# Patient Record
Sex: FEMALE | Race: WHITE | NOT HISPANIC OR LATINO | Employment: FULL TIME | URBAN - METROPOLITAN AREA
[De-identification: names, ages, dates, MRNs, and addresses within clinical notes are randomized per-mention and may not be internally consistent; named-entity substitution may affect disease eponyms.]

---

## 2023-03-13 LAB
ALANINE AMINOTRANSFERASE (SGPT) (U/L) IN SER/PLAS: 173 U/L (ref 7–45)
ALBUMIN (G/DL) IN SER/PLAS: 3.9 G/DL (ref 3.4–5)
ALKALINE PHOSPHATASE (U/L) IN SER/PLAS: 498 U/L (ref 33–110)
ANION GAP IN SER/PLAS: 12 MMOL/L (ref 10–20)
ASPARTATE AMINOTRANSFERASE (SGOT) (U/L) IN SER/PLAS: 133 U/L (ref 9–39)
BASOPHILS (10*3/UL) IN BLOOD BY AUTOMATED COUNT: 0.03 X10E9/L (ref 0–0.1)
BASOPHILS/100 LEUKOCYTES IN BLOOD BY AUTOMATED COUNT: 0.7 % (ref 0–2)
BILIRUBIN TOTAL (MG/DL) IN SER/PLAS: 1.1 MG/DL (ref 0–1.2)
CALCIUM (MG/DL) IN SER/PLAS: 9.6 MG/DL (ref 8.6–10.3)
CARBON DIOXIDE, TOTAL (MMOL/L) IN SER/PLAS: 26 MMOL/L (ref 21–32)
CHLORIDE (MMOL/L) IN SER/PLAS: 104 MMOL/L (ref 98–107)
CREATININE (MG/DL) IN SER/PLAS: 0.72 MG/DL (ref 0.5–1.05)
EOSINOPHILS (10*3/UL) IN BLOOD BY AUTOMATED COUNT: 0.23 X10E9/L (ref 0–0.7)
EOSINOPHILS/100 LEUKOCYTES IN BLOOD BY AUTOMATED COUNT: 5.1 % (ref 0–6)
ERYTHROCYTE DISTRIBUTION WIDTH (RATIO) BY AUTOMATED COUNT: 12.3 % (ref 11.5–14.5)
ERYTHROCYTE MEAN CORPUSCULAR HEMOGLOBIN CONCENTRATION (G/DL) BY AUTOMATED: 32.9 G/DL (ref 32–36)
ERYTHROCYTE MEAN CORPUSCULAR VOLUME (FL) BY AUTOMATED COUNT: 101 FL (ref 80–100)
ERYTHROCYTES (10*6/UL) IN BLOOD BY AUTOMATED COUNT: 4.17 X10E12/L (ref 4–5.2)
GFR FEMALE: >90 ML/MIN/1.73M2
GLUCOSE (MG/DL) IN SER/PLAS: 90 MG/DL (ref 74–99)
HEMATOCRIT (%) IN BLOOD BY AUTOMATED COUNT: 42.3 % (ref 36–46)
HEMOGLOBIN (G/DL) IN BLOOD: 13.9 G/DL (ref 12–16)
IMMATURE GRANULOCYTES/100 LEUKOCYTES IN BLOOD BY AUTOMATED COUNT: 0.4 % (ref 0–0.9)
LEUKOCYTES (10*3/UL) IN BLOOD BY AUTOMATED COUNT: 4.5 X10E9/L (ref 4.4–11.3)
LYMPHOCYTES (10*3/UL) IN BLOOD BY AUTOMATED COUNT: 1.45 X10E9/L (ref 1.2–4.8)
LYMPHOCYTES/100 LEUKOCYTES IN BLOOD BY AUTOMATED COUNT: 32.2 % (ref 13–44)
MONOCYTES (10*3/UL) IN BLOOD BY AUTOMATED COUNT: 0.56 X10E9/L (ref 0.1–1)
MONOCYTES/100 LEUKOCYTES IN BLOOD BY AUTOMATED COUNT: 12.4 % (ref 2–10)
NEUTROPHILS (10*3/UL) IN BLOOD BY AUTOMATED COUNT: 2.22 X10E9/L (ref 1.2–7.7)
NEUTROPHILS/100 LEUKOCYTES IN BLOOD BY AUTOMATED COUNT: 49.2 % (ref 40–80)
PLATELETS (10*3/UL) IN BLOOD AUTOMATED COUNT: 147 X10E9/L (ref 150–450)
POTASSIUM (MMOL/L) IN SER/PLAS: 4.1 MMOL/L (ref 3.5–5.3)
PROTEIN TOTAL: 7.6 G/DL (ref 6.4–8.2)
SODIUM (MMOL/L) IN SER/PLAS: 138 MMOL/L (ref 136–145)
UREA NITROGEN (MG/DL) IN SER/PLAS: 15 MG/DL (ref 6–23)

## 2023-03-14 NOTE — RESULT ENCOUNTER NOTE
Please advise patient her Alk phos, ALT, and AST are all significantly elevated. However, they are improved since her last labs in 5/2022. She should follow-up with GI, Dr. Jb Villalpando. Please assist her to schedule that appointment.

## 2023-03-15 PROBLEM — M62.838 MUSCLE SPASM: Status: ACTIVE | Noted: 2023-03-15

## 2023-03-15 PROBLEM — G89.29 CHRONIC RIGHT FLANK PAIN: Status: ACTIVE | Noted: 2023-03-15

## 2023-03-15 PROBLEM — K59.09 CHRONIC CONSTIPATION: Status: ACTIVE | Noted: 2023-03-15

## 2023-03-15 PROBLEM — E50.9 VITAMIN A DEFICIENCY: Status: ACTIVE | Noted: 2023-03-15

## 2023-03-15 PROBLEM — G47.26 SHIFT WORK SLEEP DISORDER: Status: ACTIVE | Noted: 2023-03-15

## 2023-03-15 PROBLEM — M54.42 CHRONIC LOW BACK PAIN WITH BILATERAL SCIATICA: Status: ACTIVE | Noted: 2023-03-15

## 2023-03-15 PROBLEM — R00.2 HEART PALPITATIONS: Status: ACTIVE | Noted: 2023-03-15

## 2023-03-15 PROBLEM — R74.8 ELEVATED ALKALINE PHOSPHATASE LEVEL: Status: ACTIVE | Noted: 2023-03-15

## 2023-03-15 PROBLEM — R74.8 ABNORMAL LIVER ENZYMES: Status: ACTIVE | Noted: 2023-03-15

## 2023-03-15 PROBLEM — R23.3 PETECHIAL RASH: Status: ACTIVE | Noted: 2023-03-15

## 2023-03-15 PROBLEM — N80.9 ENDOMETRIOSIS: Status: ACTIVE | Noted: 2023-03-15

## 2023-03-15 PROBLEM — U07.1 COVID-19: Status: ACTIVE | Noted: 2023-03-15

## 2023-03-15 PROBLEM — F43.21 GRIEVING: Status: ACTIVE | Noted: 2023-03-15

## 2023-03-15 PROBLEM — R10.9 CHRONIC RIGHT FLANK PAIN: Status: ACTIVE | Noted: 2023-03-15

## 2023-03-15 PROBLEM — R53.81 MALAISE AND FATIGUE: Status: ACTIVE | Noted: 2023-03-15

## 2023-03-15 PROBLEM — L65.9 HAIR LOSS: Status: ACTIVE | Noted: 2023-03-15

## 2023-03-15 PROBLEM — K74.3 PRIMARY BILIARY CHOLANGITIS (MULTI): Status: ACTIVE | Noted: 2023-03-15

## 2023-03-15 PROBLEM — F41.9 ANXIETY: Status: ACTIVE | Noted: 2023-03-15

## 2023-03-15 PROBLEM — E55.9 VITAMIN D DEFICIENCY: Status: ACTIVE | Noted: 2023-03-15

## 2023-03-15 PROBLEM — G89.29 CHRONIC LOW BACK PAIN WITH BILATERAL SCIATICA: Status: ACTIVE | Noted: 2023-03-15

## 2023-03-15 PROBLEM — R11.0 DAILY NAUSEA: Status: ACTIVE | Noted: 2023-03-15

## 2023-03-15 PROBLEM — M81.0 OSTEOPOROSIS: Status: ACTIVE | Noted: 2023-03-15

## 2023-03-15 PROBLEM — E53.8 VITAMIN B 12 DEFICIENCY: Status: ACTIVE | Noted: 2023-03-15

## 2023-03-15 PROBLEM — R76.8 POSITIVE ANA (ANTINUCLEAR ANTIBODY): Status: ACTIVE | Noted: 2023-03-15

## 2023-03-15 PROBLEM — R51.9 HEADACHE: Status: ACTIVE | Noted: 2023-03-15

## 2023-03-15 PROBLEM — M54.41 CHRONIC LOW BACK PAIN WITH BILATERAL SCIATICA: Status: ACTIVE | Noted: 2023-03-15

## 2023-03-15 PROBLEM — U07.1 COVID-19 VIRUS INFECTION: Status: ACTIVE | Noted: 2023-03-15

## 2023-03-15 PROBLEM — B02.9 HERPES ZOSTER: Status: ACTIVE | Noted: 2023-03-15

## 2023-03-15 PROBLEM — B02.29 POST HERPETIC NEURALGIA: Status: ACTIVE | Noted: 2023-03-15

## 2023-03-15 PROBLEM — Z86.19 HISTORY OF SHINGLES: Status: ACTIVE | Noted: 2023-03-15

## 2023-03-15 PROBLEM — B99.9 RECURRENT INFECTIONS: Status: ACTIVE | Noted: 2023-03-15

## 2023-03-15 PROBLEM — U09.9 POST COVID-19 CONDITION, UNSPECIFIED: Status: ACTIVE | Noted: 2023-03-15

## 2023-03-15 PROBLEM — R53.83 MALAISE AND FATIGUE: Status: ACTIVE | Noted: 2023-03-15

## 2023-03-15 RX ORDER — ALBUTEROL SULFATE 0.83 MG/ML
2.5 SOLUTION RESPIRATORY (INHALATION)
COMMUNITY
Start: 2021-11-24 | End: 2023-07-03 | Stop reason: ALTCHOICE

## 2023-03-15 RX ORDER — CALCIUM CARBONATE 300MG(750)
TABLET,CHEWABLE ORAL
COMMUNITY

## 2023-03-15 RX ORDER — BACLOFEN 10 MG/1
1 TABLET ORAL 3 TIMES DAILY PRN
COMMUNITY
Start: 2021-09-07 | End: 2023-10-25 | Stop reason: ALTCHOICE

## 2023-03-15 RX ORDER — OLANZAPINE 10 MG/1
1 TABLET ORAL NIGHTLY
COMMUNITY

## 2023-03-15 RX ORDER — GABAPENTIN 400 MG/1
1 CAPSULE ORAL 3 TIMES DAILY
COMMUNITY
Start: 2020-12-07

## 2023-03-15 RX ORDER — CETIRIZINE HYDROCHLORIDE 10 MG/1
1 TABLET ORAL DAILY
COMMUNITY

## 2023-03-15 RX ORDER — KETOROLAC TROMETHAMINE 10 MG/1
10 TABLET, FILM COATED ORAL DAILY PRN
COMMUNITY
Start: 2021-09-07

## 2023-03-15 RX ORDER — OMEPRAZOLE 40 MG/1
1 CAPSULE, DELAYED RELEASE ORAL DAILY
COMMUNITY
Start: 2019-08-14

## 2023-03-16 ENCOUNTER — OFFICE VISIT (OUTPATIENT)
Dept: PRIMARY CARE | Facility: CLINIC | Age: 48
End: 2023-03-16
Payer: COMMERCIAL

## 2023-03-16 VITALS
WEIGHT: 150 LBS | DIASTOLIC BLOOD PRESSURE: 61 MMHG | BODY MASS INDEX: 23.49 KG/M2 | OXYGEN SATURATION: 96 % | TEMPERATURE: 98.8 F | RESPIRATION RATE: 16 BRPM | SYSTOLIC BLOOD PRESSURE: 95 MMHG | HEART RATE: 87 BPM

## 2023-03-16 DIAGNOSIS — R53.83 MALAISE AND FATIGUE: Primary | ICD-10-CM

## 2023-03-16 DIAGNOSIS — R53.81 MALAISE AND FATIGUE: Primary | ICD-10-CM

## 2023-03-16 PROCEDURE — 99214 OFFICE O/P EST MOD 30 MIN: CPT | Performed by: FAMILY MEDICINE

## 2023-03-16 NOTE — PROGRESS NOTES
"Subjective   Patient ID: Olya Watters is a 47 y.o. female who presents for Fatigue (Had covid in feb but within the last week is extremely exhausted. ), B12 Injection (Stopped over 1 yr ago ), and Insomnia (Trouble falling asleep. So tired but cannot go to sleep. Trouble getting out of bed in the morning. ).    HPI   Patient comes in today for evaluation of malaise and fatigue.  She states she is tired all the time.  She just feels like she is \"going to collapse\".  Its been going on for at least a week and a half.  She describes herself normally as a go-getter.  She states she did have COVID in early February 2023 but she recovered pretty much back to normal.  She is at a loss to explain what is going on now.  She  denies any new stressors in her life.  Review of Systems   All other systems reviewed and are negative.      Objective   BP 95/61   Pulse 87   Temp 37.1 °C (98.8 °F)   Resp 16   Wt 68 kg (150 lb)   LMP  (LMP Unknown)   SpO2 96%   BMI 23.49 kg/m²     Physical Exam  Constitutional:       Appearance: She is well-developed.   HENT:      Head: Normocephalic and atraumatic.      Right Ear: Tympanic membrane, ear canal and external ear normal.      Left Ear: Tympanic membrane, ear canal and external ear normal.      Nose: Nose normal.      Mouth/Throat:      Mouth: Mucous membranes are moist.      Pharynx: Oropharynx is clear.   Eyes:      Extraocular Movements: Extraocular movements intact.      Conjunctiva/sclera: Conjunctivae normal.      Pupils: Pupils are equal, round, and reactive to light.   Cardiovascular:      Rate and Rhythm: Normal rate and regular rhythm.      Heart sounds: Normal heart sounds. No murmur heard.  Pulmonary:      Effort: Pulmonary effort is normal.      Breath sounds: Normal breath sounds. No wheezing.   Abdominal:      General: Abdomen is flat. Bowel sounds are normal.      Palpations: Abdomen is soft.   Musculoskeletal:         General: Normal range of motion.   Skin:     " General: Skin is warm and dry.   Neurological:      General: No focal deficit present.      Mental Status: She is alert and oriented to person, place, and time. Mental status is at baseline.   Psychiatric:         Mood and Affect: Mood normal.         Behavior: Behavior normal.         Thought Content: Thought content normal.         Judgment: Judgment normal.         Assessment/Plan   Problem List Items Addressed This Visit       Malaise and fatigue - Primary    Relevant Orders    Lipid Panel (Completed)    Vitamin B12 (Completed)    TSH with reflex to Free T4 if abnormal (Completed)    Hemoglobin A1C (Completed)    Vitamin D, Total (Completed)   Will contact patient with lab results when available.  Medication list reconciled.  Thank you for visiting today!      Professional services: Some of this note was completed using Dragon voice technology and sometimes the software misinterprets words. This may include unintended errors with respect to translation of words, typographical errors or grammar errors which may not have been identified prior to finalization of the chart note. Please take this into account when reading the note. Thank you.

## 2023-03-17 ENCOUNTER — LAB (OUTPATIENT)
Dept: LAB | Facility: LAB | Age: 48
End: 2023-03-17
Payer: COMMERCIAL

## 2023-03-17 DIAGNOSIS — R53.83 MALAISE AND FATIGUE: ICD-10-CM

## 2023-03-17 DIAGNOSIS — R53.81 MALAISE AND FATIGUE: ICD-10-CM

## 2023-03-17 LAB
CALCIDIOL (25 OH VITAMIN D3) (NG/ML) IN SER/PLAS: 38 NG/ML
CHOLESTEROL (MG/DL) IN SER/PLAS: 276 MG/DL (ref 0–199)
CHOLESTEROL IN HDL (MG/DL) IN SER/PLAS: 110.4 MG/DL
CHOLESTEROL/HDL RATIO: 2.5
COBALAMIN (VITAMIN B12) (PG/ML) IN SER/PLAS: 480 PG/ML (ref 211–911)
ESTIMATED AVERAGE GLUCOSE FOR HBA1C: 85 MG/DL
HEMOGLOBIN A1C/HEMOGLOBIN TOTAL IN BLOOD: 4.6 %
LDL: 148 MG/DL (ref 0–99)
THYROTROPIN (MIU/L) IN SER/PLAS BY DETECTION LIMIT <= 0.05 MIU/L: 1.36 MIU/L (ref 0.44–3.98)
TRIGLYCERIDE (MG/DL) IN SER/PLAS: 88 MG/DL (ref 0–149)
VLDL: 18 MG/DL (ref 0–40)

## 2023-03-17 PROCEDURE — 36415 COLL VENOUS BLD VENIPUNCTURE: CPT

## 2023-03-17 PROCEDURE — 80061 LIPID PANEL: CPT

## 2023-03-17 PROCEDURE — 83036 HEMOGLOBIN GLYCOSYLATED A1C: CPT

## 2023-03-17 PROCEDURE — 82306 VITAMIN D 25 HYDROXY: CPT

## 2023-03-17 PROCEDURE — 82607 VITAMIN B-12: CPT

## 2023-03-17 PROCEDURE — 84443 ASSAY THYROID STIM HORMONE: CPT

## 2023-03-20 ENCOUNTER — TELEPHONE (OUTPATIENT)
Dept: PRIMARY CARE | Facility: CLINIC | Age: 48
End: 2023-03-20
Payer: COMMERCIAL

## 2023-03-24 ENCOUNTER — DOCUMENTATION (OUTPATIENT)
Dept: PRIMARY CARE | Facility: CLINIC | Age: 48
End: 2023-03-24
Payer: COMMERCIAL

## 2023-03-24 DIAGNOSIS — R53.81 MALAISE AND FATIGUE: Primary | ICD-10-CM

## 2023-03-24 DIAGNOSIS — E53.8 VITAMIN B 12 DEFICIENCY: ICD-10-CM

## 2023-03-24 DIAGNOSIS — R53.83 MALAISE AND FATIGUE: Primary | ICD-10-CM

## 2023-03-24 RX ORDER — PREDNISONE 20 MG/1
TABLET ORAL
Qty: 24 TABLET | Refills: 0 | Status: SHIPPED | OUTPATIENT
Start: 2023-03-24 | End: 2023-07-03 | Stop reason: ALTCHOICE

## 2023-03-24 RX ORDER — CYANOCOBALAMIN 1000 UG/ML
1000 INJECTION, SOLUTION INTRAMUSCULAR; SUBCUTANEOUS
Qty: 4 ML | Refills: 0 | Status: SHIPPED | OUTPATIENT
Start: 2023-03-24 | End: 2023-05-12 | Stop reason: SDUPTHER

## 2023-03-24 NOTE — PROGRESS NOTES
I spoke with patient this afternoon and informed her of her normal most recent labs.  She would still like to stay on the B12 shots monthly.  She states Dr. Torrez her GI specialist had her on them because she does not absorb B12 orally.    She continues to complain of extreme fatigue and has made an appointment with Dr. Villalpando her liver specialist.  In the meantime this may be residual from her positive COVID last month.  We will place her on a 2-week course of prednisone taper.

## 2023-05-12 DIAGNOSIS — E53.8 VITAMIN B 12 DEFICIENCY: ICD-10-CM

## 2023-05-12 RX ORDER — CYANOCOBALAMIN 1000 UG/ML
1000 INJECTION, SOLUTION INTRAMUSCULAR; SUBCUTANEOUS
Qty: 4 ML | Refills: 0 | Status: SHIPPED | OUTPATIENT
Start: 2023-05-12 | End: 2024-05-31 | Stop reason: ALTCHOICE

## 2023-05-12 NOTE — TELEPHONE ENCOUNTER
Patient is requesting a refill on her     Vitamin B12 Shot     Sent to [facility] in [place]    Thank You

## 2023-05-12 NOTE — TELEPHONE ENCOUNTER
Requested Prescriptions     Pending Prescriptions Disp Refills    cyanocobalamin (Vitamin B-12) 1,000 mcg/mL injection 4 mL 0     Sig: Inject 1 mL (1,000 mcg) into the shoulder, thigh, or buttocks every 30 (thirty) days.

## 2023-06-26 ENCOUNTER — APPOINTMENT (OUTPATIENT)
Dept: LAB | Facility: LAB | Age: 48
End: 2023-06-26
Payer: COMMERCIAL

## 2023-06-26 LAB
ALANINE AMINOTRANSFERASE (SGPT) (U/L) IN SER/PLAS: 262 U/L (ref 7–45)
ALBUMIN (G/DL) IN SER/PLAS: 4.1 G/DL (ref 3.4–5)
ALKALINE PHOSPHATASE (U/L) IN SER/PLAS: 650 U/L (ref 33–110)
ALPHA-1 FETOPROTEIN (NG/ML) IN SER/PLAS: <4 NG/ML (ref 0–9)
ANION GAP IN SER/PLAS: 13 MMOL/L (ref 10–20)
ASPARTATE AMINOTRANSFERASE (SGOT) (U/L) IN SER/PLAS: 142 U/L (ref 9–39)
BILIRUBIN TOTAL (MG/DL) IN SER/PLAS: 0.9 MG/DL (ref 0–1.2)
CALCIUM (MG/DL) IN SER/PLAS: 9.3 MG/DL (ref 8.6–10.3)
CARBON DIOXIDE, TOTAL (MMOL/L) IN SER/PLAS: 28 MMOL/L (ref 21–32)
CHLORIDE (MMOL/L) IN SER/PLAS: 100 MMOL/L (ref 98–107)
CREATININE (MG/DL) IN SER/PLAS: 0.58 MG/DL (ref 0.5–1.05)
ERYTHROCYTE DISTRIBUTION WIDTH (RATIO) BY AUTOMATED COUNT: 12 % (ref 11.5–14.5)
ERYTHROCYTE MEAN CORPUSCULAR HEMOGLOBIN CONCENTRATION (G/DL) BY AUTOMATED: 34 G/DL (ref 32–36)
ERYTHROCYTE MEAN CORPUSCULAR VOLUME (FL) BY AUTOMATED COUNT: 99 FL (ref 80–100)
ERYTHROCYTES (10*6/UL) IN BLOOD BY AUTOMATED COUNT: 4.35 X10E12/L (ref 4–5.2)
GFR FEMALE: >90 ML/MIN/1.73M2
GLUCOSE (MG/DL) IN SER/PLAS: 76 MG/DL (ref 74–99)
HEMATOCRIT (%) IN BLOOD BY AUTOMATED COUNT: 43 % (ref 36–46)
HEMOGLOBIN (G/DL) IN BLOOD: 14.6 G/DL (ref 12–16)
INR IN PPP BY COAGULATION ASSAY: 0.9 (ref 0.9–1.1)
LEUKOCYTES (10*3/UL) IN BLOOD BY AUTOMATED COUNT: 5 X10E9/L (ref 4.4–11.3)
PLATELETS (10*3/UL) IN BLOOD AUTOMATED COUNT: 90 X10E9/L (ref 150–450)
POTASSIUM (MMOL/L) IN SER/PLAS: 3.9 MMOL/L (ref 3.5–5.3)
PROTEIN TOTAL: 7.6 G/DL (ref 6.4–8.2)
PROTHROMBIN TIME (PT) IN PPP BY COAGULATION ASSAY: 10.2 SEC (ref 9.8–12.8)
SODIUM (MMOL/L) IN SER/PLAS: 137 MMOL/L (ref 136–145)
UREA NITROGEN (MG/DL) IN SER/PLAS: 15 MG/DL (ref 6–23)

## 2023-07-03 ENCOUNTER — OFFICE VISIT (OUTPATIENT)
Dept: PRIMARY CARE | Facility: CLINIC | Age: 48
End: 2023-07-03
Payer: COMMERCIAL

## 2023-07-03 VITALS
DIASTOLIC BLOOD PRESSURE: 68 MMHG | SYSTOLIC BLOOD PRESSURE: 107 MMHG | RESPIRATION RATE: 16 BRPM | BODY MASS INDEX: 23.96 KG/M2 | OXYGEN SATURATION: 97 % | HEART RATE: 76 BPM | TEMPERATURE: 97.8 F | WEIGHT: 153 LBS

## 2023-07-03 DIAGNOSIS — R79.89 ELEVATED LFTS: ICD-10-CM

## 2023-07-03 DIAGNOSIS — R53.81 MALAISE AND FATIGUE: Primary | ICD-10-CM

## 2023-07-03 DIAGNOSIS — E55.9 VITAMIN D DEFICIENCY: ICD-10-CM

## 2023-07-03 DIAGNOSIS — R53.83 MALAISE AND FATIGUE: Primary | ICD-10-CM

## 2023-07-03 DIAGNOSIS — K59.09 CHRONIC CONSTIPATION: ICD-10-CM

## 2023-07-03 DIAGNOSIS — E53.8 VITAMIN B 12 DEFICIENCY: ICD-10-CM

## 2023-07-03 DIAGNOSIS — R74.8 ELEVATED ALKALINE PHOSPHATASE LEVEL: ICD-10-CM

## 2023-07-03 PROCEDURE — 99214 OFFICE O/P EST MOD 30 MIN: CPT | Performed by: FAMILY MEDICINE

## 2023-07-03 NOTE — PROGRESS NOTES
"Subjective   Patient ID: Olya Watters is a 48 y.o. female who presents for Constipation (Trid everything otc, only thing effective is dulcolax), Fatigue, Dizziness, and Hypoglycemia (When she feels really shaky and naseduous she checks and it is low. ).    HPI   Patient comes in today complaining of a myriad of symptoms including dizziness, lightheadedness, constipation and fatigue.  She states she is also checked her blood sugar a few times and it has been low.  She is uncertain why.  She was recently in to see her hepatologist  and he advised her to have an MRI and possible liver biopsy and also to follow-up with Dr. Torrez her gastroenterologist regarding her constipation.  Patient claims that medicines like MiraLAX do nothing for her.  She has had to use Dulcolax and citrate of magnesia.  She has not tried Linzess.    3/16/2023  Patient comes in today for evaluation of malaise and fatigue.  She states she is tired all the time.  She just feels like she is \"going to collapse\".  Its been going on for at least a week and a half.  She describes herself normally as a go-getter.  She states she did have COVID in early February 2023 but she recovered pretty much back to normal.  She is at a loss to explain what is going on now.  She  denies any new stressors in her life.        2/21/2022  Patient is seen today as a telemedicine visit rendered via realtime interactive audio/video [employer] services as we are currently in the middle of a coronavirus pandemic worldwide. The patient was informed about the telehealth clinical encounter including benefits to avoiding travel and limitations to the assessment. In person care may be recommended if needed. Telehealth sessions are not being recorded and personal health information is protected.    Patient presents today claiming she still is having significant aftereffects from her COVID from November 2021. She still endorses severe fatigue, heart palpitations, anxiety, " "hair loss and petechial rash on her chest. She states her hair is falling out by the handfuls daily.    11/30/2021  Patient is seen today as a telemedicine visit rendered via realtime interactive audio/video [employer] services as we are currently in the middle of a coronavirus pandemic worldwide. The patient was informed about the telehealth clinical encounter including benefits to avoiding travel and limitations to the assessment. In person care may be recommended if needed. Telehealth sessions are not being recorded and personal health information is protected.    Patient presents today for symptoms of COVID-19. She tested positive last Wednesday the day before Thanksgiving. She has been \"feeling like garbage\". She has fatigue, low back pain, leg weakness. When she tested positive she did call the service and was given albuterol and Tessalon Perles which she has been using for her cough with some success. She has run high fevers as high as 105 degrees throughout the last 6 days. Last Friday her arms felt weak along with a low back pain and bilateral leg pain. She has been trying over-the-counter medicines such as Tylenol Advil and Aleve all without much success. She is wondering what else she can do.    11/16/2021  Patient presents today for a physical exam. She claims she is continuing to lose weight although she has actually gained 3 pounds since February. She still has bouts of shingles. She would like to try to find a different hepatologist for her primary biliary cirrhosis. She claims she is still having back pain, she had an MRI just last week showing small paracentral disc herniation at the L5-S1 level. She is seeing pain management and rheumatology for that. Patient also sees Dr. Esparza for allergies.    Patient is requesting a note stating because of her health conditions she should not get the Covid vaccine. However Dr. Esparza has stated that the vaccination is not contraindicated for patient and " that she should mount a response.    2/24/2021  Patient is seen today as a telemedicine visit rendered via realtime interactive audio/video [employer] services as we are currently in the middle of a coronavirus pandemic worldwide. The patient was informed about the telehealth clinical encounter including benefits to avoiding travel and limitations to the assessment. In person care may be recommended if needed. Telehealth sessions are not being recorded and personal health information is protected.    Patient presents today with 2-1/2-week history of fever, sore throat, fatigue and headaches. She has tested 1 time a few weeks ago for COVID-19 and it was negative. Her sore throat, fatigue and headaches however continue. Her headaches are controlled with Advil. Patient is concerned because of her primary biliary cirrhosis diagnosis. She states that 2 years ago she had symptoms similar to this and was diagnosed with mono. She has 2 children at home and her  and all of them right now are without symptoms. Patient has been going to work.    Patient is the  of a hospice program and her mother passed away last month in her hospice program with end-stage CHF at 70 years old. She is an only child. Her father is healthy at 68 years old.    12/7/2020  Patient presents today for follow-up on her shingles pain and nerve pain. She states she has been having flareups of her postherpetic neuralgia almost monthly. She states the only thing that seems to relieve the pain is the gabapentin. She is running out of the medicine. She feels like it could be a little stronger. Nothing else works. She has not yet had her shingles vaccine. She is concerned because of the frequency of the neuralgia showing up.    Patient is under some stress currently as she just recently placed her mother into her hospice program of which she is the . Her mother has significant congestive heart failure with less than 30%  ejection fraction. She had been declining in [facility] where she was living so she brought her up here.    9/8/2020  Patient comes in today complaining of back pain. She claims it is getting progressively worse. It's in the back pain lower back mostly but it does occasionally go down both legs. She does see a chiropractor on a regular basis. She is having difficulty with extending backward and only has flexion to 45° without pain in the lower back. She states she has chronic problems with her lower back. The chiropractic treatments haven't helped much.    5/8/2020  Patient is seen today as a telemedicine visit rendered via realtime interactive audio/video [employer] services as we are currently in the middle of a coronavirus pandemic worldwide with stay-at-home orders by the government. The patient was informed about the telehealth clinical encounter including benefits to avoiding travel and limitations to the assessment. In person care may be recommended if needed. Telehealth sessions are not being recorded and personal health information is protected.     Olya presents today for follow-up on her right flank radicular pain. It seems to start out in the right back and travels around beneath the right breast and in between the breasts but doesn't cross the midline. She was seen in the ER on 3/29/2020 for this pain and a CT of the chest and abdomen and pelvis was done. The only thing that was found was mild changes in the liver consistent with her cirrhosis. The rest of the scan was negative. Patient has noticed that gabapentin does seem to help her sleep. She describes her pain on a daily basis all day long. She has also been tried on some budesonide from her [facility]  and that has not made any difference.    Patient also has a history of endometriosis and has had a hysterectomy with both ovaries remaining. Dr. Lemus is her gynecologist. I have recommended that she follow-up with Dr. Lemus to see if the  endometriosis may be playing a part in her chronic pains.    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 She would like to have FMLA forms filled out for her work place.    3/29/2020 ER Visit  44-year-old female with history of primary biliary cirrhosis states that  she's had right upper sided abdominal pain off and on for 3-4 weeks worst about  the last 24 hours. She denies any fevers. She denies any cough, shortness of  breath, ear pain, sore throat No burning with urination. No chest pain. No  shortness of breath. No dyspnea on exertion. No rash. She does have a history  of recurrent shingles but states she hasn't had shingles in quite some time.  She denies any abdominal pain that is lower. She denies any swelling in her  legs. She denies any recent surgeries. She states that her GI specialist that  she sees this with the [facility]. She states that the last time she had  blood work done was in the fall. She's also had a prior hysterectomy. She's had  a prior appendectomy. She also has a history of endometriosis and has had  surgeries for endometriosis prior to hysterectomy. She has not taken anything  for the pain. Nothing makes the pain better or worse. There is no associated  symptoms to the pain. She denies any nausea vomiting or diarrhea. She denies  any burning or blood in her stools or dark tarry stools.      2/20/2020  Patient comes in today for evaluation of recurrent herpes zoster. She had the initial episode in May 2018 and was seen again in June 2018 when it came back.    Patient states that last month on January 15, 2020 it came back again. It is always in the same place on the left buttock. She works for  and told him about it and he gave her a prescription of Valtrex for it. This past Monday 2/17/2020 it came back again on the left buttock.      3/26/2019 8:35 AM)  The patient is a 43  "year old female here for follow up. The patient is here to discuss a single health problem: bp (Pt is here to discuss low bp and symptoms.).    Additional reasons for visit:    Cold symptoms is described as the following:  Symptoms include loss of appetite, runny nose, nasal congestion, headache, cough, vomiting, diarrhea, body aches and hoarseness. The episodes last for 4 days. The patient describes this as unchanged. Current treatment includes Ibuprofen. Note for \"Cold symptoms\": Patient comes in today in follow-up for only her second visit to the practice. She was here on 1/31/19 for her first visit with flulike symptoms and since then she just hasn't felt right. She states that ever since then her blood pressure has been very low in fact it has gotten as low as 55/38. Last month when she was at her GI specialist visit her blood pressure was 103/39. She states that she is waking up with a nonproductive cough almost every night, 3-5 nights a week. She states she wakes up with her heart pounding and she gets up to get a drink water and she is okay.    Patient has a known history of primary biliary cirrhosis which was diagnosed in August 2014. She sees Dr. Fior Torrez as her GI specialist. Last month she had a problem with bleeding hemorrhoids. She has lost 15 pounds in the last 3 months but since she went from working night shift to day shift she is now having a more consistent eating pattern. Her maternal grandfather had \"some kind of digestive cancer\". Her paternal uncle had GIST. Her maternal uncle had colon polyps.    She had an EGD 10/14 which showed reactive gastropathy, HP negative.  She had a colonoscopy 10/14 that was unremarkable.  She had right upper quadrant ultrasound 5/22/18 showing liver normal and no gallstones or biliary dilatation. She takes Rui 500 mg twice a day.    Past Hx:  Patient comes in today as a new patient to the practice.  She claims she has been sick since Monday.  She started out " "with congestion on Monday but on Tuesday she developed a high fever along with a severe headache and overall body aches.  This is the first time she has been out of bed since Tuesday.  Her 10-year-old son was diagnosed with influenza A on Tuesday.  Both he and the patient had flu vaccines this year.    She has had a dry hacking cough and Mucinex has been of no help.  She has had a runny nose and nasal congestion.  She has been trying coolmist vaporizer along with saline nasal spray and Vicks all without relief.    6/1/2018   The patient is a 43 year old female who presents with a complaint of a skin rash. The rash has been occurring for 4 days (She came out with shingles on Monday.  Her  took a picture and she sent it to the [facility].  She was in the shower and felt something on her left buttocks.  It felt like a pimple.  It wasn't painful yet or irritated.  It got worse tuesday and then started to itch.  It got worse then and you could see pustules.  The [facility] rx'd valtrex 1g three times a day.  She is having pain from the buttock that extends down the foot as well.). The rash is characterized as itchy, painful, red and raised above the skin. The rash was first seen on the lower extremity (buttock). It spread to the lower extremity (LT leg). Note for \"Rash\": Pt said she did a [facility] once she noticed a rash on her buttock. She was Dx with shingles and prescribed Valtrex. Pt said that it is painful and the pain goes down her LT leg.      Review of Systems   All other systems reviewed and are negative.      Objective   /68   Pulse 76   Temp 36.6 °C (97.8 °F)   Resp 16   Wt 69.4 kg (153 lb)   LMP  (LMP Unknown)   SpO2 97%   BMI 23.96 kg/m²     Physical Exam  Vitals reviewed.   Constitutional:       Appearance: She is well-developed.   HENT:      Head: Normocephalic and atraumatic.      Right Ear: Tympanic membrane, ear canal and external ear normal.      Left Ear: Tympanic membrane, ear canal and " external ear normal.      Nose: Nose normal.      Mouth/Throat:      Mouth: Mucous membranes are moist.      Pharynx: Oropharynx is clear.   Eyes:      Extraocular Movements: Extraocular movements intact.      Conjunctiva/sclera: Conjunctivae normal.      Pupils: Pupils are equal, round, and reactive to light.   Cardiovascular:      Rate and Rhythm: Normal rate and regular rhythm.      Heart sounds: Normal heart sounds. No murmur heard.  Pulmonary:      Effort: Pulmonary effort is normal.      Breath sounds: Normal breath sounds. No wheezing.   Abdominal:      General: Abdomen is flat. Bowel sounds are normal.      Palpations: Abdomen is soft.   Musculoskeletal:         General: Normal range of motion.   Skin:     General: Skin is warm and dry.   Neurological:      General: No focal deficit present.      Mental Status: She is alert and oriented to person, place, and time. Mental status is at baseline.   Psychiatric:         Mood and Affect: Mood normal.         Behavior: Behavior normal.         Thought Content: Thought content normal.         Judgment: Judgment normal.         Assessment/Plan   Problem List Items Addressed This Visit       Chronic constipation    Relevant Medications    linaCLOtide (Linzess) 72 mcg capsule    Other Relevant Orders    Referral to Gastroenterology    Elevated alkaline phosphatase level    Malaise and fatigue - Primary    Vitamin B 12 deficiency    Relevant Orders    Vitamin B12    Vitamin D deficiency    Relevant Orders    Vitamin D, Total    Elevated LFTs   Will contact patient with lab results when available.  Follow-up with GI.  Trial of Linzess.  Take new medication as directed.  Continue other medications as directed.  RTC in one month for recheck, sooner should problems arise.  Medication list reconciled.  Thank you for visiting today!      Professional services: Some of this note was completed using Dragon voice technology and sometimes the software misinterprets words. This  may include unintended errors with respect to translation of words, typographical errors or grammar errors which may not have been identified prior to finalization of the chart note. Please take this into account when reading the note. Thank you.

## 2023-07-04 PROBLEM — R79.89 ELEVATED LFTS: Status: ACTIVE | Noted: 2023-07-04

## 2023-08-10 ENCOUNTER — LAB (OUTPATIENT)
Dept: LAB | Facility: LAB | Age: 48
End: 2023-08-10
Payer: COMMERCIAL

## 2023-08-10 DIAGNOSIS — E53.8 VITAMIN B 12 DEFICIENCY: ICD-10-CM

## 2023-08-10 DIAGNOSIS — E55.9 VITAMIN D DEFICIENCY: ICD-10-CM

## 2023-08-10 LAB
ACTIVATED PARTIAL THROMBOPLASTIN TIME IN PPP BY COAGULATION ASSAY: 33 SEC (ref 27–38)
ALANINE AMINOTRANSFERASE (SGPT) (U/L) IN SER/PLAS: 121 U/L (ref 7–45)
ALBUMIN (G/DL) IN SER/PLAS: 3.8 G/DL (ref 3.4–5)
ALKALINE PHOSPHATASE (U/L) IN SER/PLAS: 454 U/L (ref 33–110)
ANION GAP IN SER/PLAS: 12 MMOL/L (ref 10–20)
ASPARTATE AMINOTRANSFERASE (SGOT) (U/L) IN SER/PLAS: 89 U/L (ref 9–39)
BASOPHILS (10*3/UL) IN BLOOD BY AUTOMATED COUNT: 0.04 X10E9/L (ref 0–0.1)
BASOPHILS/100 LEUKOCYTES IN BLOOD BY AUTOMATED COUNT: 0.9 % (ref 0–2)
BILIRUBIN TOTAL (MG/DL) IN SER/PLAS: 1 MG/DL (ref 0–1.2)
CALCIDIOL (25 OH VITAMIN D3) (NG/ML) IN SER/PLAS: 50 NG/ML
CALCIUM (MG/DL) IN SER/PLAS: 9.4 MG/DL (ref 8.6–10.3)
CARBON DIOXIDE, TOTAL (MMOL/L) IN SER/PLAS: 26 MMOL/L (ref 21–32)
CHLORIDE (MMOL/L) IN SER/PLAS: 104 MMOL/L (ref 98–107)
COBALAMIN (VITAMIN B12) (PG/ML) IN SER/PLAS: 449 PG/ML (ref 211–911)
CREATININE (MG/DL) IN SER/PLAS: 0.71 MG/DL (ref 0.5–1.05)
EOSINOPHILS (10*3/UL) IN BLOOD BY AUTOMATED COUNT: 0.2 X10E9/L (ref 0–0.7)
EOSINOPHILS/100 LEUKOCYTES IN BLOOD BY AUTOMATED COUNT: 4.4 % (ref 0–6)
ERYTHROCYTE DISTRIBUTION WIDTH (RATIO) BY AUTOMATED COUNT: 12.1 % (ref 11.5–14.5)
ERYTHROCYTE MEAN CORPUSCULAR HEMOGLOBIN CONCENTRATION (G/DL) BY AUTOMATED: 32.9 G/DL (ref 32–36)
ERYTHROCYTE MEAN CORPUSCULAR VOLUME (FL) BY AUTOMATED COUNT: 103 FL (ref 80–100)
ERYTHROCYTES (10*6/UL) IN BLOOD BY AUTOMATED COUNT: 4.03 X10E12/L (ref 4–5.2)
GFR FEMALE: >90 ML/MIN/1.73M2
GLUCOSE (MG/DL) IN SER/PLAS: 71 MG/DL (ref 74–99)
HEMATOCRIT (%) IN BLOOD BY AUTOMATED COUNT: 41.7 % (ref 36–46)
HEMOGLOBIN (G/DL) IN BLOOD: 13.7 G/DL (ref 12–16)
IMMATURE GRANULOCYTES/100 LEUKOCYTES IN BLOOD BY AUTOMATED COUNT: 0 % (ref 0–0.9)
INR IN PPP BY COAGULATION ASSAY: 0.9 (ref 0.9–1.1)
LEUKOCYTES (10*3/UL) IN BLOOD BY AUTOMATED COUNT: 4.5 X10E9/L (ref 4.4–11.3)
LYMPHOCYTES (10*3/UL) IN BLOOD BY AUTOMATED COUNT: 1.43 X10E9/L (ref 1.2–4.8)
LYMPHOCYTES/100 LEUKOCYTES IN BLOOD BY AUTOMATED COUNT: 31.7 % (ref 13–44)
MONOCYTES (10*3/UL) IN BLOOD BY AUTOMATED COUNT: 0.45 X10E9/L (ref 0.1–1)
MONOCYTES/100 LEUKOCYTES IN BLOOD BY AUTOMATED COUNT: 10 % (ref 2–10)
NEUTROPHILS (10*3/UL) IN BLOOD BY AUTOMATED COUNT: 2.39 X10E9/L (ref 1.2–7.7)
NEUTROPHILS/100 LEUKOCYTES IN BLOOD BY AUTOMATED COUNT: 53 % (ref 40–80)
PLATELETS (10*3/UL) IN BLOOD AUTOMATED COUNT: 128 X10E9/L (ref 150–450)
POTASSIUM (MMOL/L) IN SER/PLAS: 3.8 MMOL/L (ref 3.5–5.3)
PROTEIN TOTAL: 7.4 G/DL (ref 6.4–8.2)
PROTHROMBIN TIME (PT) IN PPP BY COAGULATION ASSAY: 10.2 SEC (ref 9.8–12.8)
SODIUM (MMOL/L) IN SER/PLAS: 138 MMOL/L (ref 136–145)
UREA NITROGEN (MG/DL) IN SER/PLAS: 14 MG/DL (ref 6–23)

## 2023-08-10 PROCEDURE — 82607 VITAMIN B-12: CPT

## 2023-08-10 PROCEDURE — 82306 VITAMIN D 25 HYDROXY: CPT

## 2023-08-10 PROCEDURE — 36415 COLL VENOUS BLD VENIPUNCTURE: CPT

## 2023-08-15 ENCOUNTER — TELEPHONE (OUTPATIENT)
Dept: PRIMARY CARE | Facility: CLINIC | Age: 48
End: 2023-08-15
Payer: COMMERCIAL

## 2023-08-15 NOTE — TELEPHONE ENCOUNTER
----- Message from Hoang Chapman DO sent at 8/14/2023  7:49 AM EDT -----  Regarding: Labs  Please notify patient of good vitamin levels.  Recheck with all labs in March of next year.  ----- Message -----  From: Lab, Background User  Sent: 8/10/2023  12:26 PM EDT  To: Hoang Chapman DO

## 2023-08-18 ENCOUNTER — HOSPITAL ENCOUNTER (OUTPATIENT)
Dept: DATA CONVERSION | Facility: HOSPITAL | Age: 48
End: 2023-08-18
Attending: ORTHOPAEDIC SURGERY | Admitting: ORTHOPAEDIC SURGERY
Payer: COMMERCIAL

## 2023-08-18 DIAGNOSIS — M24.112 OTHER ARTICULAR CARTILAGE DISORDERS, LEFT SHOULDER: ICD-10-CM

## 2023-08-18 DIAGNOSIS — S43.432A SUPERIOR GLENOID LABRUM LESION OF LEFT SHOULDER, INITIAL ENCOUNTER: ICD-10-CM

## 2023-09-29 VITALS — HEIGHT: 67 IN | BODY MASS INDEX: 24.15 KG/M2 | WEIGHT: 153.88 LBS

## 2023-09-30 NOTE — H&P
History & Physical Reviewed:   Pregnant/Lactating:  ·  Are You Pregnant no   ·  Are You Currently Breastfeeding no     I have reviewed the History and Physical dated:  13-Aug-2023   History and Physical reviewed and relevant findings noted. Patient examined to review pertinent physical  findings.: No significant changes   Home Medications Reviewed: no changes noted   Allergies Reviewed: no changes noted     Consent:   COVID-19 Consent:  ·  COVID-19 Risk Consent Surgeon has reviewed key risks related to the risk of aurelio COVID-19 and if they contract COVID-19 what the risks are.       Electronic Signatures:  Jai Loo)  (Signed 18-Aug-2023 07:56)   Authored: History & Physical Reviewed, Consent, Note  Completion      Last Updated: 18-Aug-2023 07:56 by Jai Loo)

## 2023-10-01 NOTE — OP NOTE
PROCEDURE DETAILS    Preoperative Diagnosis:  Left shoulder anterior-inferior labral tear with a history of instability  Postoperative Diagnosis:  Left shoulder anterior-inferior labral tear with a history of instability  Surgeon: Jai Loo  Resident/Fellow/Other Assistant: Jemal Stephens    Procedure:  1. ARTHROSCOPY LEFT SHOULDER, ANTERIOR CAPSULORRHAPHY, DEBRIDEMENT INFERIOR LABRUM     Anesthesia: General + regional   Estimated Blood Loss: 5 ml  Findings: see op report         Operative Report:   Findings ([facility] classification):  Glenoid: Small defect likely posttraumatic 1 cm x 1/2 cm anterior margin humerus, mid height  Humeral head: No Hill-Sachs defect    Procedure:  The patient was met in pre-operative holding and the appropriate extremity was marked.  The patient was transported to the operating room and underwent general anesthesia.  The patient was placed in a lateral position with all bony prominences well padded  including the common peroneal nerve and ulnar nerve.  An axillary roll was placed.  The ipsilateral arm was suspended with 10lbs of weight and an arm rae was carefully wrapped around the arm.  Antibiotics were administered according to protocol.    After prep, drape, antibiotics and time out the bony landmarks were palpated.     A posterior portal was created with an 11 blade and the glenohumeral joint was entered using a blunt trochar without issue.  A diagnostic arthroscopy was performed evaluating the articular cartilage of the humeral head and glenoid, the subscapularis,  under surface of the supra and infraspinatus, the long head of the biceps, labrum and axillary recess.      An anterior portal was created in the rotator interval (outside / in) with an 18 G spinal needle and a blunt trocar was inserted. A probe was introduced and the biceps / labrum were palpated.  The biceps anchor was stable posterior labrum had significant  inflammation and capsule adjacent had  inflammation but no obvious tearing, flipped piece of inferior labrum into the axillary pouch consistent with MRI this was debrided using a shaver.    From the anterior portal  an elevator was used to free the labrum from the rim of the glenoid.  A cannula was then placed.  A 90 degree suture lasso and scorpion suture pass were used  to shuttle suture.  We started inferiorly around the 5:30 to 6:00  position and advanced the labrum and capsule up onto the rim of the glenoid.  A total of 4 labral sutures were placed and fixated to the glenoid using 2.9 mm Arthrex PushLocks.  A nice bumper was recreated and the humeral head appeared well centered in  the glenoid.    The shoulder was copiously lavaged and closed using 3-0 vicryl and 3-0 monofilament suture. Sterile 4 x 4's, abd pads were placed followed by foam tape.  The patient was placed in a sling and stable to recovery.  All counts were reported as correct.   There were no complications.    The physician assistant was present for the entire case.  Given the nature of the procedure and disease process a skilled surgical assistant was  necessary for the case.  The assistant was necessary for retraction and helped directly facilitate completion of the surgery.  A certified scrub tech was at the back table managing instruments and supplies for the surgical procedure.  Note Recipients:   Hoang Chapman MD - [phone] []  Jai Loo MD - [id number] [PREFERRED]                        Attestation:   Note Completion:  Attending Attestation I performed the procedure without a resident         Electronic Signatures:  Jai Loo)  (Signed 18-Aug-2023 10:50)   Authored: Post-Operative Note, Chart Review, Note Completion      Last Updated: 18-Aug-2023 10:50 by Jai Loo)

## 2023-10-24 ENCOUNTER — OFFICE VISIT (OUTPATIENT)
Dept: ORTHOPEDIC SURGERY | Facility: CLINIC | Age: 48
End: 2023-10-24
Payer: COMMERCIAL

## 2023-10-24 DIAGNOSIS — E55.9 VITAMIN D DEFICIENCY: Primary | ICD-10-CM

## 2023-10-24 PROCEDURE — 99024 POSTOP FOLLOW-UP VISIT: CPT | Performed by: ORTHOPAEDIC SURGERY

## 2023-10-24 PROCEDURE — 1036F TOBACCO NON-USER: CPT | Performed by: ORTHOPAEDIC SURGERY

## 2023-10-24 NOTE — LETTER
October 24, 2023     Patient: Olya Watters   YOB: 1975   Date of Visit: 10/24/2023       To Whom It May Concern:    It is my medical opinion that Olya Watters may return to work on 10/30/23 , with no restrictions.    If you have any questions or concerns, please don't hesitate to call.         Sincerely,        Jai Loo MD    CC: No Recipients

## 2023-10-24 NOTE — PROGRESS NOTES
History of Present Illness:  Patient returns today after shoulder arthroscopy.  The patient denies any significant pain at rest but does have pain with motion and activities.  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    Physical Examination:  Well healed incisions, no significant swelling   Forward flexion: lacks motion to c/L side  Rotation decreased versus contralateral side  Neurovascular exam normal distally    Assessment:  Patient status post left shoulder arthroscopy with anterior capsulorrhaphy and debridement of labrum    Operative findings:  Glenoid: Small defect likely posttraumatic 1 cm x 1/2 cm anterior margin   humerus, mid height   Humeral head: No Hill-Sachs defect     Plan:  Discussed transition out of sling.  Discussed physical therapy protocol and activity restrictions.  Follow-up ~ 4-6 weeks.     Jemal Stephens PA-C     In a face to face encounter, I evaluated the patient and performed a physical examination, discussed pertinent diagnostic studies if indicated and discussed diagnosis and management strategies with both the patient and physician assistant / nurse practitioner.  I reviewed the PA/NP's note and agree with the documented findings and plan of care.    Patient with mild capsulitis discussed importance of continued home exercise program patient has limited visit with therapy but may really reinstitute more visits early next year.  Overall doing well.    Jai Loo MD

## 2023-10-25 ENCOUNTER — OFFICE VISIT (OUTPATIENT)
Dept: CARDIOLOGY | Facility: CLINIC | Age: 48
End: 2023-10-25
Payer: COMMERCIAL

## 2023-10-25 VITALS
DIASTOLIC BLOOD PRESSURE: 60 MMHG | BODY MASS INDEX: 23.73 KG/M2 | SYSTOLIC BLOOD PRESSURE: 108 MMHG | HEIGHT: 68 IN | WEIGHT: 156.6 LBS | HEART RATE: 80 BPM

## 2023-10-25 DIAGNOSIS — R01.1 MURMUR, HEART: ICD-10-CM

## 2023-10-25 DIAGNOSIS — R00.2 HEART PALPITATIONS: ICD-10-CM

## 2023-10-25 PROBLEM — Z87.891 FORMER SMOKER: Status: ACTIVE | Noted: 2023-10-25

## 2023-10-25 PROCEDURE — 1036F TOBACCO NON-USER: CPT | Performed by: INTERNAL MEDICINE

## 2023-10-25 PROCEDURE — 99204 OFFICE O/P NEW MOD 45 MIN: CPT | Performed by: INTERNAL MEDICINE

## 2023-10-25 PROCEDURE — 3008F BODY MASS INDEX DOCD: CPT | Performed by: INTERNAL MEDICINE

## 2023-10-25 RX ORDER — CELECOXIB 200 MG/1
200 CAPSULE ORAL DAILY
COMMUNITY
Start: 2023-09-19 | End: 2023-10-25 | Stop reason: ALTCHOICE

## 2023-10-25 RX ORDER — ALBUTEROL SULFATE 90 UG/1
2 AEROSOL, METERED RESPIRATORY (INHALATION)
COMMUNITY
Start: 2023-02-11 | End: 2023-10-25 | Stop reason: ALTCHOICE

## 2023-10-25 RX ORDER — ASPIRIN 81 MG/1
81 TABLET ORAL DAILY
COMMUNITY
End: 2024-05-31 | Stop reason: WASHOUT

## 2023-10-25 RX ORDER — CYCLOBENZAPRINE HCL 10 MG
10 TABLET ORAL DAILY
COMMUNITY

## 2023-10-25 RX ORDER — ALBUTEROL SULFATE 0.83 MG/ML
2.5 SOLUTION RESPIRATORY (INHALATION) EVERY 4 HOURS PRN
COMMUNITY
Start: 2021-11-24 | End: 2023-10-25 | Stop reason: ALTCHOICE

## 2023-10-25 RX ORDER — CITALOPRAM 10 MG/1
10 TABLET ORAL DAILY
COMMUNITY
End: 2023-10-25 | Stop reason: ALTCHOICE

## 2023-10-25 RX ORDER — BENZONATATE 200 MG/1
1 CAPSULE ORAL EVERY 8 HOURS PRN
COMMUNITY
Start: 2023-02-11 | End: 2023-10-25 | Stop reason: ALTCHOICE

## 2023-10-25 RX ORDER — ESCITALOPRAM OXALATE 10 MG/1
10 TABLET ORAL DAILY
COMMUNITY
Start: 2015-05-18 | End: 2023-10-25 | Stop reason: ALTCHOICE

## 2023-10-25 NOTE — PROGRESS NOTES
CARDIOLOGY OFFICE VISIT      CHIEF COMPLAINT      HISTORY OF PRESENT ILLNESS  The patient is being seen today for cardiac evaluation.  She states she had a bad case of COVID in 2021.  She states she had another episode of COVID in February 2023.  She states she has been bothered for some time with a fluttering sensation in her chest.  Some people told her that she has had atrial fibrillation in the past.  I do not believe from talking to her that this is ever been definitely documented.  There is a family history of atrial fibrillation and strokes.  There is also family history of hypertrophic cardiomyopathy.  She did go to the emergency room recently for chest discomfort.  Everything checked out well and she was discharged.  She denies any significant problem with dyspnea.  She denies syncope.    EKG: Normal sinus rhythm, short WA interval, right axis deviation, done 9/26/2023.    Lipid profile: Cholesterol 276, , , triglycerides 88, results discussed with patient    Impression:  Palpitations, evaluate for any significant cardiac arrhythmia  Hyperlipidemia  Former smoker  History of liver disorder, primary biliary cholangitis  Overweight  Positive family history of atrial fibrillation and strokes  Family history of hypertrophic cardiomyopathy    Please excuse any errors in grammar or translation related to this dictation.  Voice recognition software was utilized to prepare this document.    Past Medical History  No past medical history on file.    Social History  Social History     Tobacco Use    Smoking status: Former     Types: Cigarettes    Smokeless tobacco: Never   Substance Use Topics    Alcohol use: Yes    Drug use: Not on file       Family History     Family History   Problem Relation Name Age of Onset    Kidney disease Mother      Heart failure Mother      Polymyalgia rheumatica Mother      Rheum arthritis Mother      Other (Scooby's disease (congenital syphilitic osteochondritis)) Mother       Hypotension Father      Cancer Father          Allergies:  Allergies   Allergen Reactions    Cephalosporins Unknown    Latex Unknown    Paroxetine Unknown    Penicillins Unknown    Sulfa (Sulfonamide Antibiotics) Unknown    Tetanus Immune Globulin Other     migraine, flu-like symptoms    Lidocaine Anxiety, Dizziness, Hallucinations, Headache, Hives and Palpitations        Outpatient Medications:  Current Outpatient Medications   Medication Instructions    baclofen (Lioresal) 10 mg tablet 1 tablet, oral, 3 times daily PRN    cetirizine (ZyrTEC) 10 mg tablet 1 tablet, oral, Daily    cyanocobalamin (VITAMIN B-12) 1,000 mcg, intramuscular, Every 30 days    gabapentin (Neurontin) 400 mg capsule 1 capsule, oral, 3 times daily    ketorolac (TORADOL) 10 mg, oral    linaCLOtide (LINZESS) 72 mcg, oral, Daily before breakfast, Do not crush or chew.    magnesium oxide (Mag-Ox) 400 mg tablet oral    OLANZapine (ZyPREXA) 10 mg tablet 1 tablet, oral, 3 times daily    omeprazole (PriLOSEC) 40 mg DR capsule 1 capsule, oral, Daily    VITAMINS A AND D ORAL 1 capsule, oral, Daily          REVIEW OF SYSTEMS  Review of Systems   All other systems reviewed and are negative.        VITALS  There were no vitals filed for this visit.    PHYSICAL EXAM  Vitals and nursing note reviewed.   Constitutional:       Appearance: Healthy appearance.   Eyes:      Conjunctiva/sclera: Conjunctivae normal.      Pupils: Pupils are equal, round, and reactive to light.   Pulmonary:      Effort: Pulmonary effort is normal.      Breath sounds: Normal breath sounds.   Cardiovascular:      PMI at left midclavicular line. Normal rate. Regular rhythm.      Murmurs: There is a systolic murmur. Soft systolic murmur at the apex      No gallop.  No click. No rub.   Pulses:     Intact distal pulses.   Edema:     Peripheral edema absent.   Musculoskeletal: Normal range of motion. Skin:     General: Skin is warm and dry.   Neurological:      Mental Status: Alert and  oriented to person, place and time.           ASSESSMENT AND PLAN      [unfilled]

## 2023-10-25 NOTE — PATIENT INSTRUCTIONS
1 week Ziopatch and echocardiogram to be scheduled to be done in office  Patient to follow up after testing.    Continue same medications/treatment.  Patient educated on proper medication use.  Please bring all medicines, vitamins and herbal supplements with you when you come to the office.\    I, Sheela Rao LPN, am scribing for and in the presence of Dr. Liu Stevenson MD, FACC

## 2023-10-30 DIAGNOSIS — R00.2 HEART PALPITATIONS: ICD-10-CM

## 2023-11-06 ENCOUNTER — ANCILLARY PROCEDURE (OUTPATIENT)
Dept: CARDIOLOGY | Facility: CLINIC | Age: 48
End: 2023-11-06
Payer: COMMERCIAL

## 2023-11-06 DIAGNOSIS — R00.2 HEART PALPITATIONS: ICD-10-CM

## 2023-11-06 PROCEDURE — 93248 EXT ECG>7D<15D REV&INTERPJ: CPT | Performed by: INTERNAL MEDICINE

## 2023-11-24 ENCOUNTER — HOSPITAL ENCOUNTER (OUTPATIENT)
Dept: CARDIOLOGY | Facility: CLINIC | Age: 48
Discharge: HOME | End: 2023-11-24
Payer: COMMERCIAL

## 2023-11-24 VITALS
BODY MASS INDEX: 23.64 KG/M2 | WEIGHT: 156 LBS | SYSTOLIC BLOOD PRESSURE: 120 MMHG | DIASTOLIC BLOOD PRESSURE: 75 MMHG | HEIGHT: 68 IN

## 2023-11-24 DIAGNOSIS — R01.1 MURMUR, HEART: ICD-10-CM

## 2023-11-24 DIAGNOSIS — R00.2 HEART PALPITATIONS: ICD-10-CM

## 2023-11-24 LAB
AORTIC VALVE MEAN GRADIENT: 3
AORTIC VALVE PEAK VELOCITY: 1.03
AV PEAK GRADIENT: 4.2
AVA (PEAK VEL): 2.21
AVA (VTI): 2.07
EJECTION FRACTION APICAL 4 CHAMBER: 61.6
EJECTION FRACTION: 59
LEFT VENTRICLE INTERNAL DIMENSION DIASTOLE: 4.1 (ref 3.5–6)
LEFT VENTRICULAR OUTFLOW TRACT DIAMETER: 1.8
MITRAL VALVE E/A RATIO: 1.25
MITRAL VALVE E/E' RATIO: 4.1
RIGHT VENTRICLE PEAK SYSTOLIC PRESSURE: 32.2

## 2023-11-24 PROCEDURE — 93306 TTE W/DOPPLER COMPLETE: CPT

## 2023-11-24 PROCEDURE — 93306 TTE W/DOPPLER COMPLETE: CPT | Performed by: INTERNAL MEDICINE

## 2023-12-05 ENCOUNTER — OFFICE VISIT (OUTPATIENT)
Dept: ORTHOPEDIC SURGERY | Facility: CLINIC | Age: 48
End: 2023-12-05
Payer: COMMERCIAL

## 2023-12-05 DIAGNOSIS — M25.519 SHOULDER PAIN, UNSPECIFIED CHRONICITY, UNSPECIFIED LATERALITY: Primary | ICD-10-CM

## 2023-12-05 PROCEDURE — 3008F BODY MASS INDEX DOCD: CPT | Performed by: ORTHOPAEDIC SURGERY

## 2023-12-05 PROCEDURE — 99213 OFFICE O/P EST LOW 20 MIN: CPT | Performed by: ORTHOPAEDIC SURGERY

## 2023-12-05 PROCEDURE — 1036F TOBACCO NON-USER: CPT | Performed by: ORTHOPAEDIC SURGERY

## 2023-12-05 NOTE — PROGRESS NOTES
History of Present Illness:  Patient returns today after shoulder arthroscopy doing well.  The patient endorses good relief of pre-operative symptoms and increasing activity level.     Physical Examination:  Well healed incisions  Forward flexion / External rotation similar to contralateral side  No significant deficits in rotator cuff strength testing  Neurovascular exam normal distally    Assessment:  Patient status post left shoulder arthroscopy anterior labral repair    Plan:  Discussed home exercise program and activities to avoid.  Discussed return to activities.  Reviewed NSAIDS for occasional pain, discussed the risks and benefits.  Follow-up: Not needed at this time

## 2023-12-06 ENCOUNTER — APPOINTMENT (OUTPATIENT)
Dept: CARDIOLOGY | Facility: CLINIC | Age: 48
End: 2023-12-06
Payer: COMMERCIAL

## 2024-01-30 ENCOUNTER — HOSPITAL ENCOUNTER (OUTPATIENT)
Dept: RADIOLOGY | Facility: CLINIC | Age: 49
Discharge: HOME | End: 2024-01-30
Payer: COMMERCIAL

## 2024-01-30 DIAGNOSIS — M79.672 LEFT FOOT PAIN: ICD-10-CM

## 2024-01-30 DIAGNOSIS — M79.672 LEFT FOOT PAIN: Primary | ICD-10-CM

## 2024-01-30 PROCEDURE — 73630 X-RAY EXAM OF FOOT: CPT | Mod: LEFT SIDE | Performed by: RADIOLOGY

## 2024-01-30 PROCEDURE — 73630 X-RAY EXAM OF FOOT: CPT | Mod: LT

## 2024-05-31 ENCOUNTER — OFFICE VISIT (OUTPATIENT)
Dept: PRIMARY CARE | Facility: CLINIC | Age: 49
End: 2024-05-31
Payer: COMMERCIAL

## 2024-05-31 VITALS
TEMPERATURE: 98.2 F | RESPIRATION RATE: 20 BRPM | DIASTOLIC BLOOD PRESSURE: 72 MMHG | HEART RATE: 92 BPM | SYSTOLIC BLOOD PRESSURE: 90 MMHG | WEIGHT: 153.4 LBS | OXYGEN SATURATION: 99 % | BODY MASS INDEX: 23.32 KG/M2

## 2024-05-31 DIAGNOSIS — B02.29 POST HERPETIC NEURALGIA: ICD-10-CM

## 2024-05-31 DIAGNOSIS — B02.9 HERPES ZOSTER WITHOUT COMPLICATION: Primary | ICD-10-CM

## 2024-05-31 PROCEDURE — 3008F BODY MASS INDEX DOCD: CPT | Performed by: NURSE PRACTITIONER

## 2024-05-31 PROCEDURE — 99213 OFFICE O/P EST LOW 20 MIN: CPT | Performed by: NURSE PRACTITIONER

## 2024-05-31 ASSESSMENT — ENCOUNTER SYMPTOMS
APPETITE CHANGE: 0
RESPIRATORY NEGATIVE: 1
CHILLS: 0
FATIGUE: 1
FEVER: 0

## 2024-05-31 NOTE — PROGRESS NOTES
Patient has a history of herpes zoster. Patient has been taking valtrex 500mg a day, since the end of April. Patient has lesions on her left buttocks. She feels like her skin all over her body is itching and burning. No new lesions. Every time she has an outbreak it is the same spot. Patient reports that she has seen Dr. Esparza, allergy, and she prescribed a standing order for valtrex. Patient has not seen Dr. Esparza in one to two years. Patient says that the shingles vaccine was ordered for her in 2017 but she has to be shingles free for six months and the last time she was shingles free for six months was two years ago.     Review of Systems   Constitutional:  Positive for fatigue. Negative for appetite change, chills and fever.   HENT: Negative.     Respiratory: Negative.     Cardiovascular:  Negative for chest pain.   Skin:  Positive for rash.   Neurological:         Skin itching and burning      Objective   BP 90/72   Pulse 92   Temp 36.8 °C (98.2 °F)   Resp 20   Wt 69.6 kg (153 lb 6.4 oz)   LMP  (LMP Unknown)   SpO2 99%   BMI 23.32 kg/m²     Physical Exam  Pt left the office prior to the exam    Assessment/Plan   Problem List Items Addressed This Visit             ICD-10-CM    Herpes zoster - Primary B02.9    Relevant Orders    Referral to Allergy     Patient states that she has had ongoing issues with herpes zoster for the past several years. She says that she was seeing Dr. Esparza, allergist, but her last visit with her was several years ago. Patient states that she has an outstanding prescription for valtrex from Dr. Esparza and it will run out in the next few days. Patient stated that she would like a prescription for maintenance Valtrex. I discussed with the patient that maintenance Valtrex is not usually prescribed for shingles and that if there is an underlying immunological issue that is causing immune suppression, patient needs to follow up with the allergist/immunologist. I  contacted Dr. Esparza' office and they stated that they would be calling her as well. I also discussed with patient that if her shingles has never really improved after taking the valtrex, that she may have a drug resistant form of shingles, and a referral to infectious disease may be appropriate. Patient was frustrated with this discussion and left the office without being examined.

## 2024-06-04 ENCOUNTER — OFFICE VISIT (OUTPATIENT)
Dept: PRIMARY CARE | Facility: CLINIC | Age: 49
End: 2024-06-04
Payer: COMMERCIAL

## 2024-06-04 VITALS
HEART RATE: 85 BPM | BODY MASS INDEX: 23.26 KG/M2 | WEIGHT: 153 LBS | RESPIRATION RATE: 16 BRPM | TEMPERATURE: 97.8 F | DIASTOLIC BLOOD PRESSURE: 65 MMHG | SYSTOLIC BLOOD PRESSURE: 96 MMHG | OXYGEN SATURATION: 97 %

## 2024-06-04 DIAGNOSIS — B02.9 HERPES ZOSTER WITHOUT COMPLICATION: Primary | ICD-10-CM

## 2024-06-04 PROCEDURE — 99213 OFFICE O/P EST LOW 20 MIN: CPT | Performed by: FAMILY MEDICINE

## 2024-06-04 RX ORDER — VALACYCLOVIR HYDROCHLORIDE 1 G/1
1000 TABLET, FILM COATED ORAL DAILY
COMMUNITY
Start: 2024-05-31

## 2024-06-04 RX ORDER — VALACYCLOVIR HYDROCHLORIDE 500 MG/1
TABLET, FILM COATED ORAL
Qty: 90 TABLET | Refills: 1 | Status: SHIPPED | OUTPATIENT
Start: 2024-06-04

## 2024-06-04 NOTE — PROGRESS NOTES
Subjective   Patient ID: Olya Watters is a 49 y.o. female who presents for Herpes Zoster (History of reccurrent shingles. Was supposed to  get shingles vaccine but her body doesn't make antibodies-told by immunologist so she never got it. She has maybe had 1-2 episodes over the lat few years. Feb had a bad case, valtrex rid it but it came right back. Every time she stops the valtrex they come back within 48 hours. Her skin is on fire. /Thinks diclofen for her hip may have a cause in this ).  HPI  Patient comes in today for evaluation of her recurrent shingles.  History as noted above.  She was just in to the urgent care 5/31/2024 with an outbreak on her left buttocks.  She claims every time she has an outbreak it is in the same spot.  Currently she has no outbreak per patient.    She reports seeing Dr. Esparza immunologist in the past but her history is unclear.  She states she was told that her body does not make antibodies so she never got the vaccine.  She was advised to follow-up with Dr. Esparza by urgent care.    Patient claims she has a new job which is less stressful.  She is now an  in a nursing home.    Review of Systems   All other systems reviewed and are negative.      Objective   Vitals:  BP 96/65   Pulse 85   Temp 36.6 °C (97.8 °F)   Resp 16   Wt 69.4 kg (153 lb)   LMP  (LMP Unknown)   SpO2 97%   BMI 23.26 kg/m²     Physical Exam  Vitals reviewed.   Constitutional:       Appearance: She is well-developed.   HENT:      Head: Normocephalic and atraumatic.      Right Ear: Tympanic membrane, ear canal and external ear normal.      Left Ear: Tympanic membrane, ear canal and external ear normal.      Nose: Nose normal.      Mouth/Throat:      Mouth: Mucous membranes are moist.      Pharynx: Oropharynx is clear.   Eyes:      Extraocular Movements: Extraocular movements intact.      Conjunctiva/sclera: Conjunctivae normal.      Pupils: Pupils are equal, round, and reactive to  light.   Cardiovascular:      Rate and Rhythm: Normal rate and regular rhythm.      Heart sounds: Normal heart sounds. No murmur heard.  Pulmonary:      Effort: Pulmonary effort is normal.      Breath sounds: Normal breath sounds. No wheezing.   Abdominal:      General: Abdomen is flat. Bowel sounds are normal.      Palpations: Abdomen is soft.   Musculoskeletal:         General: Normal range of motion.   Skin:     General: Skin is warm and dry.   Neurological:      General: No focal deficit present.      Mental Status: She is alert and oriented to person, place, and time. Mental status is at baseline.   Psychiatric:         Mood and Affect: Mood normal.         Behavior: Behavior normal.         Thought Content: Thought content normal.         Judgment: Judgment normal.       Assessment/Plan   Problem List Items Addressed This Visit       Herpes zoster - Primary    Relevant Medications    valACYclovir (Valtrex) 500 mg tablet   Urgent care note reviewed  Prophylactic Valtrex 500 mg daily.  Medication list reconciled.  Thank you for visiting today!      Professional services: Some of this note was completed using Dragon voice technology and sometimes the software misinterprets words. This may include unintended errors with respect to translation of words, typographical errors or grammar errors which may not have been identified prior to finalization of the chart note. Please take this into account when reading the note. Thank you.       Hoang Chapman DO 06/08/24 9:08 AM

## 2024-06-10 ENCOUNTER — TELEPHONE (OUTPATIENT)
Dept: PRIMARY CARE | Facility: CLINIC | Age: 49
End: 2024-06-10
Payer: COMMERCIAL

## 2024-06-10 NOTE — TELEPHONE ENCOUNTER
----- Message from Hoang Chapman DO sent at 6/7/2024  4:14 PM EDT -----  Regarding: Olya Watters  Thank you.  Have a great weekend!  ----- Message -----  From: PENNY Barrett  Sent: 6/6/2024  10:20 AM EDT  To: Hoang Chapman DO    Hi VANNA Arguelles message from Dr. Esparza  ----- Message -----  From: Bibi Esparza MD  Sent: 6/6/2024   8:47 AM EDT  To: PENNY Barrett    I see a referral for recurrent shingles. I saw this patient in 2021 and her immune work up was normal.  At this point, I do not have anything else to offer her.  I think it would be more appropriate for her to see infectious disease.    If you have any questions please feel free to contact the office.    In good health,    Dr. Bibi Esparza

## 2024-06-12 PROBLEM — R53.83 MALAISE AND FATIGUE: Status: RESOLVED | Noted: 2023-03-15 | Resolved: 2024-06-12

## 2024-06-12 PROBLEM — R11.0 DAILY NAUSEA: Status: RESOLVED | Noted: 2023-03-15 | Resolved: 2024-06-12

## 2024-06-12 PROBLEM — R53.81 MALAISE AND FATIGUE: Status: RESOLVED | Noted: 2023-03-15 | Resolved: 2024-06-12

## 2024-06-12 PROBLEM — R23.3 PETECHIAL RASH: Status: RESOLVED | Noted: 2023-03-15 | Resolved: 2024-06-12

## 2024-06-12 PROBLEM — U07.1 COVID-19: Status: RESOLVED | Noted: 2023-03-15 | Resolved: 2024-06-12

## 2024-06-12 NOTE — PROGRESS NOTES
Subjective   Patient ID:   [id number]   Olya Watters is a 49 y.o. female who presents for recurrent shingles.    Chief Complaint   Patient presents with    recurrent shingles     This is a new patient, with a H/O primary biliary cholangitis, positive WEOR, endometriosis, osteoporosis, referred by Lily Don, ROBYN-CNP, for evaluation of recurrent shingles/infections.    Patient states since her last visit here in 2021 for shingles, she was well up until Feb 2024.  She had onset of shingles on her left hip that was open, which eventually resolved  with Valtrex.  In Feb 2024, she went to Pain Management and was prescribed diclofenac sodium, which decreases immune system, per patient.  A few weeks later, she had recurrence of the shingles and took the Valtrex.  She stopped it and 2 days later, had another recurrence.  She had a televisit and was given another cycle but recurred 2 days after stopping the Valtrex.  She saw Dr. Chapman last week, who gave her Valtrex 500 mg daily and is working for her.  She stopped the diclofenac and decided she would rather deal with her pain than have recurrent shingles.       Patient denies any boils, fungal infections or pneumonia.  She endorses elevated liver function tests frequently.  She saw Dr. Torrez, Hepatologist, but has not seen him in a couple of years.  Dr. Torrez referred her to Dr. Villalpando who told her he would do the same thing Dr. Torrez was doing.  She has not had a liver scan or biopsy in about 5 years  She is aware of what triggers her liver issues and controls her diet.  She avoids processed/refined foods and red meats and consumes veggies and chicken.      Patient had her first yeast infection after 15 years.  She is on probiotics and drinking plenty of water.      She has not yet had a zoster vaccine.        Objective     /62 (BP Location: Left arm, Patient Position: Sitting, BP Cuff Size: Adult)   Pulse 78   LMP  (LMP Unknown)   SpO2 98%      Physical  Exam  Constitutional:       Appearance: Normal appearance. She is normal weight.   HENT:      Head: Normocephalic and atraumatic.      Right Ear: External ear normal.      Left Ear: External ear normal.   Eyes:      Extraocular Movements: Extraocular movements intact.      Conjunctiva/sclera: Conjunctivae normal.      Pupils: Pupils are equal, round, and reactive to light.   Cardiovascular:      Rate and Rhythm: Normal rate.   Pulmonary:      Effort: Pulmonary effort is normal.   Skin:     General: Skin is warm and dry.   Neurological:      Mental Status: She is alert and oriented to person, place, and time.   Psychiatric:         Mood and Affect: Mood normal.         Behavior: Behavior normal.         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 Her infections started after taking Diclofenac. She is stable on 500 mg  of Valtrex daily.  Her case is unusual because she makes antibodies but has persistent recurrent bouts.    She will have blood work for immune evaluation.  I advised her to continue Valtrex for 6 months and if she is well a week later, she should have her Shingrix.       By signing my name below, I, Jeffrey Arana, attest that this documentation has been prepared under the direction and in the presence of Bibi Esparza MD.  All medical record entries made by the Scribe were at my direction and personally dictated by me. I have reviewed the chart and agree that the record accurately reflects my personal performance of the history, physical exam, discussion and plan.

## 2024-06-13 ENCOUNTER — APPOINTMENT (OUTPATIENT)
Dept: ALLERGY | Facility: CLINIC | Age: 49
End: 2024-06-13
Payer: COMMERCIAL

## 2024-06-13 ENCOUNTER — LAB (OUTPATIENT)
Dept: LAB | Facility: LAB | Age: 49
End: 2024-06-13
Payer: COMMERCIAL

## 2024-06-13 VITALS — HEART RATE: 78 BPM | OXYGEN SATURATION: 98 % | DIASTOLIC BLOOD PRESSURE: 62 MMHG | SYSTOLIC BLOOD PRESSURE: 114 MMHG

## 2024-06-13 DIAGNOSIS — B00.9 RECURRENT NONGENITAL HERPES SIMPLEX VIRUS (HSV) INFECTION: ICD-10-CM

## 2024-06-13 DIAGNOSIS — B00.9 RECURRENT NONGENITAL HERPES SIMPLEX VIRUS (HSV) INFECTION: Primary | ICD-10-CM

## 2024-06-13 LAB
ALBUMIN SERPL BCP-MCNC: 4.1 G/DL (ref 3.4–5)
ALP SERPL-CCNC: 698 U/L (ref 33–110)
ALT SERPL W P-5'-P-CCNC: 191 U/L (ref 7–45)
ANION GAP SERPL CALC-SCNC: 13 MMOL/L (ref 10–20)
AST SERPL W P-5'-P-CCNC: 127 U/L (ref 9–39)
BASOPHILS # BLD AUTO: 0.03 X10*3/UL (ref 0–0.1)
BASOPHILS NFR BLD AUTO: 0.7 %
BILIRUB SERPL-MCNC: 1.2 MG/DL (ref 0–1.2)
BUN SERPL-MCNC: 16 MG/DL (ref 6–23)
CALCIUM SERPL-MCNC: 9.5 MG/DL (ref 8.6–10.6)
CHLORIDE SERPL-SCNC: 103 MMOL/L (ref 98–107)
CO2 SERPL-SCNC: 25 MMOL/L (ref 21–32)
CREAT SERPL-MCNC: 0.58 MG/DL (ref 0.5–1.05)
EGFRCR SERPLBLD CKD-EPI 2021: >90 ML/MIN/1.73M*2
EOSINOPHIL # BLD AUTO: 0.18 X10*3/UL (ref 0–0.7)
EOSINOPHIL NFR BLD AUTO: 4.5 %
ERYTHROCYTE [DISTWIDTH] IN BLOOD BY AUTOMATED COUNT: 12.6 % (ref 11.5–14.5)
GLUCOSE SERPL-MCNC: 80 MG/DL (ref 74–99)
HCT VFR BLD AUTO: 39.3 % (ref 36–46)
HGB BLD-MCNC: 13.1 G/DL (ref 12–16)
IMM GRANULOCYTES # BLD AUTO: 0.01 X10*3/UL (ref 0–0.7)
IMM GRANULOCYTES NFR BLD AUTO: 0.2 % (ref 0–0.9)
LYMPHOCYTES # BLD AUTO: 1.11 X10*3/UL (ref 1.2–4.8)
LYMPHOCYTES NFR BLD AUTO: 27.7 %
MCH RBC QN AUTO: 33.7 PG (ref 26–34)
MCHC RBC AUTO-ENTMCNC: 33.3 G/DL (ref 32–36)
MCV RBC AUTO: 101 FL (ref 80–100)
MONOCYTES # BLD AUTO: 0.38 X10*3/UL (ref 0.1–1)
MONOCYTES NFR BLD AUTO: 9.5 %
NEUTROPHILS # BLD AUTO: 2.3 X10*3/UL (ref 1.2–7.7)
NEUTROPHILS NFR BLD AUTO: 57.4 %
NRBC BLD-RTO: 0 /100 WBCS (ref 0–0)
PLATELET # BLD AUTO: 142 X10*3/UL (ref 150–450)
POTASSIUM SERPL-SCNC: 4.3 MMOL/L (ref 3.5–5.3)
PROT SERPL-MCNC: 7.4 G/DL (ref 6.4–8.2)
RBC # BLD AUTO: 3.89 X10*6/UL (ref 4–5.2)
SODIUM SERPL-SCNC: 137 MMOL/L (ref 136–145)
WBC # BLD AUTO: 4 X10*3/UL (ref 4.4–11.3)

## 2024-06-13 PROCEDURE — 88184 FLOWCYTOMETRY/ TC 1 MARKER: CPT

## 2024-06-13 PROCEDURE — 36415 COLL VENOUS BLD VENIPUNCTURE: CPT

## 2024-06-13 PROCEDURE — 86353 LYMPHOCYTE TRANSFORMATION: CPT

## 2024-06-13 PROCEDURE — 99214 OFFICE O/P EST MOD 30 MIN: CPT | Performed by: ALLERGY & IMMUNOLOGY

## 2024-06-13 PROCEDURE — 80053 COMPREHEN METABOLIC PANEL: CPT

## 2024-06-13 PROCEDURE — 88185 FLOWCYTOMETRY/TC ADD-ON: CPT

## 2024-06-13 PROCEDURE — 85025 COMPLETE CBC W/AUTO DIFF WBC: CPT

## 2024-06-13 NOTE — PATIENT INSTRUCTIONS
Complete blood work to evaluate immune system.  We will call you with results, recommendations and followup plan.      Continue the Valtrex for 6 months and then stop it.  If after a week you are well, get your Shingrix vaccine.

## 2024-06-13 NOTE — ASSESSMENT & PLAN NOTE
She C/O 4 episodes of recurrent shingles bouts after stopping Valtrex since Feb 2024. Her infections started after taking Diclofenac. She is stable on 500 mg  of Valtrex daily.  Her case is unusual because she makes antibodies but has persistent recurrent bouts.    She will have blood work for immune evaluation.  I advised her to continue Valtrex for 6 months and if she is well a week later, she should have her Shingrix.

## 2024-06-14 LAB
CD19 CELLS # BLD: 0.09 X10E9/L
CD19 CELLS NFR BLD: 8 %
CD3 CELLS # BLD: 0.91 X10E9/L
CD3 CELLS NFR SPEC: 82 %
CD3+CD4+ CELLS # BLD: 0.6 X10E9/L
CD3+CD4+ CELLS # BLD: 599 /MM3
CD3+CD4+ CELLS NFR BLD: 54 %
CD3+CD4+ CELLS/CD3+CD8+ CLL BLD: 1.93 %
CD3+CD4-CD8-CD45+ CELLS NFR BLD: 2 %
CD3+CD8+ CELLS # BLD: 0.31 X10E9/L
CD3+CD8+ CELLS NFR BLD: 28 %
CD3-CD16+CD56+ CELLS # BLD: 0.1 X10E9/L
CD3-CD16+CD56+ CELLS NFR BLD: 9 %
FLOW CYTOMETRY SPECIALIST REVIEW: NORMAL
LYMPHOCYTES # SPEC AUTO: 1.11 X10*3/UL
PATH REVIEW, IMMUNODEFICIENCY PROFILE: NORMAL

## 2024-06-19 LAB
ANNOTATION COMMENT IMP: NORMAL
FLOW CYTOMETRY SPECIALIST REVIEW: NORMAL
LPT OKT3 BLD-NRATE: 91.6 %
LPT PHA MAX/CD45 NFR BLD FC: 85.7 %
LPT PW MAX/CD19 NFR BLD FC: 33 %
LPT PW/CD3 NFR BLD FC: 22.7 %
LPT PW/CD45 NFR BLD FC: 22.8 %
VIABLE CELLS NFR BLD: 86.7 %

## 2024-06-23 LAB
ANNOTATION COMMENT IMP: ABNORMAL
FLOW CYTOMETRY SPECIALIST REVIEW: ABNORMAL
LPT CA MAX/CD3 BLD FC-NFR: 19.2 %
LPT CA MAX/CD45 BLD FC-NFR: 17 %
LPT TT MAX/CD3 BLD FC-NFR: 3 %
LPT TT MAX/CD45 BLD FC-NFR: 2.5 %
VIABLE CELLS NFR BLD: 86.7 %

## 2024-12-14 ENCOUNTER — ANCILLARY PROCEDURE (OUTPATIENT)
Dept: URGENT CARE | Age: 49
End: 2024-12-14
Payer: COMMERCIAL

## 2024-12-14 ENCOUNTER — OFFICE VISIT (OUTPATIENT)
Dept: URGENT CARE | Age: 49
End: 2024-12-14
Payer: COMMERCIAL

## 2024-12-14 VITALS
BODY MASS INDEX: 23.7 KG/M2 | RESPIRATION RATE: 18 BRPM | SYSTOLIC BLOOD PRESSURE: 103 MMHG | HEART RATE: 92 BPM | WEIGHT: 151 LBS | TEMPERATURE: 98 F | HEIGHT: 67 IN | DIASTOLIC BLOOD PRESSURE: 67 MMHG | OXYGEN SATURATION: 99 %

## 2024-12-14 DIAGNOSIS — R06.02 SHORTNESS OF BREATH: ICD-10-CM

## 2024-12-14 DIAGNOSIS — R50.9 FEVER, UNSPECIFIED FEVER CAUSE: ICD-10-CM

## 2024-12-14 DIAGNOSIS — R06.02 SHORTNESS OF BREATH: Primary | ICD-10-CM

## 2024-12-14 DIAGNOSIS — J18.9 COMMUNITY ACQUIRED PNEUMONIA OF LEFT LUNG, UNSPECIFIED PART OF LUNG: ICD-10-CM

## 2024-12-14 LAB
POC APPEARANCE, URINE: ABNORMAL
POC BILIRUBIN, URINE: NEGATIVE
POC BINAX NOW COVID SERIAL NUMBER: NORMAL
POC BLOOD, URINE: NEGATIVE
POC COLOR, URINE: ABNORMAL
POC GLUCOSE, URINE: NEGATIVE MG/DL
POC KETONES, URINE: NEGATIVE MG/DL
POC LEUKOCYTES, URINE: NEGATIVE
POC NITRITE,URINE: NEGATIVE
POC PH, URINE: 6 PH
POC PROTEIN, URINE: ABNORMAL MG/DL
POC RAPID INFLUENZA A: NEGATIVE
POC RAPID INFLUENZA B: NEGATIVE
POC SPECIFIC GRAVITY, URINE: >=1.03
POC UROBILINOGEN, URINE: 0.2 EU/DL
PREGNANCY TEST URINE, POC: NEGATIVE

## 2024-12-14 PROCEDURE — 71046 X-RAY EXAM CHEST 2 VIEWS: CPT | Performed by: PHYSICIAN ASSISTANT

## 2024-12-14 RX ORDER — LEVOFLOXACIN 750 MG/1
750 TABLET ORAL EVERY 24 HOURS
Qty: 7 TABLET | Refills: 0 | Status: SHIPPED | OUTPATIENT
Start: 2024-12-14 | End: 2024-12-21

## 2024-12-14 RX ORDER — PROMETHAZINE HYDROCHLORIDE AND DEXTROMETHORPHAN HYDROBROMIDE 6.25; 15 MG/5ML; MG/5ML
5 SYRUP ORAL
Qty: 118 ML | Refills: 0 | Status: SHIPPED | OUTPATIENT
Start: 2024-12-14 | End: 2024-12-21

## 2024-12-14 ASSESSMENT — ENCOUNTER SYMPTOMS: FEVER: 1

## 2024-12-14 NOTE — PROGRESS NOTES
Subjective   Patient ID: Olya Watters is a 49 y.o. female. They present today with a chief complaint of Fever.    History of Present Illness    Fever       49-year-old patient presents to clinic accompanied by pts.  with complaints of fever of  of 105.6  on a ear thermometer with associated dry cough, dizziness, headache, shortness of breath,  nausea, body aches localized to lower extremities and back with cough for the past 4 days with new development of diarrhea, dysuria, and decreased urine output since yesterday.  Reports has tried Advil and Tylenol with relief of fevers.  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  Reports has history of endometriosis.   Patient's  showed photo of thermometer temperature 105.6.  Denies vomiting, ear discharge, sore throat,  urinary frequency, urinary urgency, hematuria, abdominal pain.      Past Medical History  Allergies as of 12/14/2024 - Reviewed 12/14/2024   Allergen Reaction Noted    Bee venom protein (honey bee) Anaphylaxis and Swelling 10/25/2023    Sumatriptan Anaphylaxis 10/25/2023    Cephalosporins Unknown 06/13/2018    Clarithromycin Unknown 10/17/2011    Latex Unknown 03/15/2023    Paroxetine Unknown 03/15/2023    Penicillins Unknown 03/15/2023    Sulfa (sulfonamide antibiotics) Unknown 03/15/2023    Tetanus immune globulin Other 03/15/2023    Tetanus vaccines and toxoid Other, Diarrhea, Headache, and Myalgia 07/28/2017    Lidocaine Anxiety, Dizziness, Hallucinations, Headache, Hives, and Palpitations 04/03/2021       (Not in a hospital admission)       Past Medical History:   Diagnosis Date    Atrial fibrillation (Multi) 12/2021    Endometriosis     Heart murmur 2002    Tarlov cysts        Past Surgical History:   Procedure Laterality Date    OTHER SURGICAL HISTORY  02/20/2020    Appendectomy    OTHER SURGICAL HISTORY  02/20/2020    Hysterectomy    OTHER SURGICAL HISTORY  02/20/2020    Liver biopsy        reports that she has quit  "smoking. Her smoking use included cigarettes. She has a 7.5 pack-year smoking history. She has never used smokeless tobacco. She reports current alcohol use. She reports that she does not use drugs.    Review of Systems  ROS negative with the exception as noted on HPI                                Objective    Vitals:    12/14/24 0935   BP: 103/67   BP Location: Left arm   Patient Position: Sitting   BP Cuff Size: Adult   Pulse: 92   Resp: 18   Temp: 36.7 °C (98 °F)   TempSrc: Oral   SpO2: 99%   Weight: 68.5 kg (151 lb)   Height: 1.702 m (5' 7\")     No LMP recorded (lmp unknown). Patient has had a hysterectomy.    Physical Exam  Constitutional:       Appearance: Normal appearance.   HENT:      Head: Normocephalic and atraumatic.      Right Ear: Tympanic membrane, ear canal and external ear normal.      Left Ear: Tympanic membrane, ear canal and external ear normal.      Nose: Mucosal edema (and erythema) present. No rhinorrhea.      Right Sinus: Maxillary sinus tenderness present. No frontal sinus tenderness.      Left Sinus: Maxillary sinus tenderness present. No frontal sinus tenderness.      Mouth/Throat:      Lips: Pink.      Mouth: Mucous membranes are moist. No oral lesions.      Dentition: Normal dentition. No gingival swelling.      Tongue: No lesions. Tongue does not deviate from midline.      Palate: No mass and lesions.      Pharynx: Posterior oropharyngeal erythema and postnasal drip present. No pharyngeal swelling or uvula swelling.   Cardiovascular:      Rate and Rhythm: Normal rate and regular rhythm.      Heart sounds: No murmur heard.  Pulmonary:      Effort: Pulmonary effort is normal. No respiratory distress.      Breath sounds: Normal breath sounds. No stridor. No wheezing, rhonchi or rales.   Abdominal:      General: Bowel sounds are normal.      Palpations: Abdomen is soft.      Tenderness: There is abdominal tenderness (diffuse; pt. reports has chronic tenderness at the abodmen diffusely.). " There is no right CVA tenderness, left CVA tenderness or guarding.   Lymphadenopathy:      Cervical: Cervical adenopathy present.   Neurological:      Mental Status: She is alert.         Procedures    Point of Care Test & Imaging Results from this visit  Results for orders placed or performed in visit on 12/14/24   POCT Covid-19 Rapid Antigen   Result Value Ref Range    Binax NOW Covid Serial Number NEG    POCT Influenza A/B manually resulted   Result Value Ref Range    POC Rapid Influenza A Negative Negative    POC Rapid Influenza B Negative Negative   POCT pregnancy, urine manually resulted   Result Value Ref Range    Preg Test, Ur Negative Negative   POCT UA Automated manually resulted   Result Value Ref Range    POC Color, Urine Iris (A) Straw, Yellow, Light-Yellow    POC Appearance, Urine Hazy (A) Clear    POC Glucose, Urine NEGATIVE NEGATIVE mg/dl    POC Bilirubin, Urine NEGATIVE NEGATIVE    POC Ketones, Urine NEGATIVE NEGATIVE mg/dl    POC Specific Gravity, Urine >=1.030 1.005 - 1.035    POC Blood, Urine NEGATIVE NEGATIVE    POC PH, Urine 6.0 No Reference Range Established PH    POC Protein, Urine TRACE (A) NEGATIVE, 30 (1+) mg/dl    POC Urobilinogen, Urine 0.2 0.2, 1.0 EU/DL    Poc Nitrite, Urine NEGATIVE NEGATIVE    POC Leukocytes, Urine NEGATIVE NEGATIVE      XR chest 2 views    Result Date: 12/14/2024  Interpreted By:  Evelyn Flower, STUDY: XR CHEST 2 VIEWS;  12/14/2024 10:22 am   INDICATION: Signs/Symptoms:shortness of breath.   ,R06.02 Shortness of breath   COMPARISON: 09/26/2023   ACCESSION NUMBER(S): [id number]   ORDERING CLINICIAN: MARY QUIROZ   FINDINGS: Subtle left perihilar/lingular infiltrate. The right lung is clear. No pleural effusion. The cardiac silhouette is within normal limits for size. Degenerative endplate spurring in the thoracic spine.       Subtle left perihilar infiltrate. Clinical correlation and follow-up to ensure resolution after appropriate treatment recommended.    MACRO: None.   Signed by: Evelyn Flower 12/14/2024 10:23 AM Dictation workstation:   [id number]     Diagnostic study results (if any) were reviewed by Patricia Ko PA-C.    Assessment/Plan   Allergies, medications, history, and pertinent labs/EKGs/Imaging reviewed by Patricia Ko PA-C.   fever of  of 105.6  on a ear thermometer with associated dry cough, dizziness, headache, shortness of breath,  nausea, body aches localized to lower extremities and back with cough for the past 4 days with new development of diarrhea, dysuria, and decreased urine output since yesterday.   Levaquin and Promethazine DM started. Advised to drink plenty of fluids, run a cool-mist humidifier in room at night, and get plenty of rest. Pt. is advised to take 10 deep breaths and hours and continue to walk around every couple of hours. Patient should avoid over-exertion and reduce exposure to irritants such as smoke, cold, dry air, and dust. Patient may take acetaminophen or ibuprofen as directed to reduce fever and body aches. Risk, benefits, and potential side effects of medication(s) discussed with pt. Discussed disease/illness presentation, treatment options, progression, complications, and outcomes with patient. Pt. Has expressed understanding and is an agreement of plan of care.     Medical Decision Making      Orders and Diagnoses  Diagnoses and all orders for this visit:  Shortness of breath  -     XR chest 2 views; Future  Fever, unspecified fever cause  -     POCT Covid-19 Rapid Antigen  -     POCT Influenza A/B manually resulted  -     POCT pregnancy, urine manually resulted  -     POCT UA Automated manually resulted  Community acquired pneumonia of left lung, unspecified part of lung  -     levoFLOXacin (Levaquin) 750 mg tablet; Take 1 tablet (750 mg) by mouth once every 24 hours for 7 days.  -     promethazine-DM (Phenergan-DM) 6.25-15 mg/5 mL syrup; Take 5 mL by mouth 3 times daily (morning, midday, late  afternoon) for 7 days.      Medical Admin Record      Patient disposition: Home    Electronically signed by Patricia Ko PA-C  10:50 AM

## 2024-12-14 NOTE — LETTER
December 14, 2024     Patient: Olya Watters   YOB: 1975   Date of Visit: 12/14/2024       To Whom It May Concern:    Olya Watters was seen in my clinic on 12/14/2024 at 10:30 am. Please excuse Olya for her absence from work on this day to make the appointment. Please excuse pt. From work over the next 2-3 days and may return to work given pt. Has been fever free for 24 hours.     If you have any questions or concerns, please don't hesitate to call.         Sincerely,         Patricia Ko PA-C        CC: No Recipients

## 2025-04-30 ENCOUNTER — HOSPITAL ENCOUNTER (OUTPATIENT)
Dept: NEUROLOGY | Facility: HOSPITAL | Age: 50
Discharge: HOME | End: 2025-04-30
Payer: COMMERCIAL

## 2025-04-30 DIAGNOSIS — M54.16 RADICULOPATHY, LUMBAR REGION: ICD-10-CM

## 2025-04-30 PROCEDURE — 95886 MUSC TEST DONE W/N TEST COMP: CPT | Performed by: STUDENT IN AN ORGANIZED HEALTH CARE EDUCATION/TRAINING PROGRAM

## 2025-04-30 PROCEDURE — 95911 NRV CNDJ TEST 9-10 STUDIES: CPT | Performed by: STUDENT IN AN ORGANIZED HEALTH CARE EDUCATION/TRAINING PROGRAM

## 2025-04-30 NOTE — ADDENDUM NOTE
Encounter addended by: Polo Hernandez on: 4/30/2025 3:01 PM   Actions taken: Imaging Exam ended, Check Out activity completed

## 2025-06-20 DIAGNOSIS — B02.9 HERPES ZOSTER WITHOUT COMPLICATION: ICD-10-CM

## 2025-06-20 RX ORDER — VALACYCLOVIR HYDROCHLORIDE 500 MG/1
TABLET, FILM COATED ORAL
Qty: 90 TABLET | Refills: 1 | Status: SHIPPED | OUTPATIENT
Start: 2025-06-20

## 2025-06-20 NOTE — TELEPHONE ENCOUNTER
Rx Refill Request Telephone Encounter    Name:  Olya Watters  :  [id number]  Medication Name:  valACYclovir (Valtrex) 500 mg tablet     Specific Pharmacy location:    [facility] #[id number] [place] [address] [address] [facility] [address] Phone: [phone]   Fax: [phone]        Date of last appointment:  24  Date of next appointment:  25  Best number to reach patient:  [phone]        Patient states that she is having a shingles outbreak and that the doctor sends this in for her when she needs it.  Patient can be reached at the number above.  Thank You

## 2025-06-20 NOTE — TELEPHONE ENCOUNTER
This was filled on 6/4/24 with 90 tablets and 1 refill. This is a PRN medication to be taken but she should have some remaining

## 2025-06-20 NOTE — TELEPHONE ENCOUNTER
Requested Prescriptions     Pending Prescriptions Disp Refills    valACYclovir (Valtrex) 500 mg tablet 90 tablet 1     Si tablets twice a day for 1 day for cold sores

## 2025-07-21 ENCOUNTER — APPOINTMENT (OUTPATIENT)
Dept: PRIMARY CARE | Facility: CLINIC | Age: 50
End: 2025-07-21
Payer: COMMERCIAL

## 2025-07-21 VITALS
HEART RATE: 77 BPM | WEIGHT: 161 LBS | SYSTOLIC BLOOD PRESSURE: 116 MMHG | DIASTOLIC BLOOD PRESSURE: 70 MMHG | TEMPERATURE: 98.4 F | RESPIRATION RATE: 20 BRPM | OXYGEN SATURATION: 98 % | HEIGHT: 67 IN | BODY MASS INDEX: 25.27 KG/M2

## 2025-07-21 DIAGNOSIS — K59.09 CHRONIC CONSTIPATION: ICD-10-CM

## 2025-07-21 DIAGNOSIS — R73.9 HYPERGLYCEMIA: ICD-10-CM

## 2025-07-21 DIAGNOSIS — B00.9 RECURRENT NONGENITAL HERPES SIMPLEX VIRUS (HSV) INFECTION: ICD-10-CM

## 2025-07-21 DIAGNOSIS — Z13.228 SCREENING FOR METABOLIC DISORDER: ICD-10-CM

## 2025-07-21 DIAGNOSIS — R53.83 FATIGUE, UNSPECIFIED TYPE: ICD-10-CM

## 2025-07-21 DIAGNOSIS — E78.2 HYPERLIPIDEMIA, MIXED: ICD-10-CM

## 2025-07-21 DIAGNOSIS — E55.9 VITAMIN D DEFICIENCY: ICD-10-CM

## 2025-07-21 DIAGNOSIS — Z00.00 ROUTINE GENERAL MEDICAL EXAMINATION AT A HEALTH CARE FACILITY: Primary | ICD-10-CM

## 2025-07-21 DIAGNOSIS — E53.8 VITAMIN B12 DEFICIENCY: ICD-10-CM

## 2025-07-21 PROCEDURE — 99396 PREV VISIT EST AGE 40-64: CPT | Performed by: FAMILY MEDICINE

## 2025-07-21 PROCEDURE — 3008F BODY MASS INDEX DOCD: CPT | Performed by: FAMILY MEDICINE

## 2025-07-21 ASSESSMENT — PATIENT HEALTH QUESTIONNAIRE - PHQ9
2. FEELING DOWN, DEPRESSED OR HOPELESS: NOT AT ALL
1. LITTLE INTEREST OR PLEASURE IN DOING THINGS: NOT AT ALL
SUM OF ALL RESPONSES TO PHQ9 QUESTIONS 1 AND 2: 0

## 2025-07-21 ASSESSMENT — ENCOUNTER SYMPTOMS
LOSS OF SENSATION IN FEET: 0
DEPRESSION: 0
OCCASIONAL FEELINGS OF UNSTEADINESS: 0

## 2025-07-21 NOTE — PROGRESS NOTES
"Subjective   Patient ID: Olya Watters is a 50 y.o. female who presents for Annual Exam (Patient reported health Fair, would like to discuss reoccurring shingles outbreaks. She will break out within a few weeks if she stops taking valtrex 500. During outbreaks she takes the 1mg. //Regular Dental Visits yes//Regular Eye Visits yes//Hearing Loss no//Balanced Diet yes//Weight Concerns yes//Exercise Regular///).    HPI  Patient presents today for 1 year checkup and refill of meds.  She states that she is taking her medicines as directed and is not having any side effects from them.     She currently sees a chiropractor on a regular basis for some right lower rib pain.  She states it helps somewhat.  She is taking Flexeril and gabapentin on a regular basis but is not taking Tylenol or NSAIDs.  I have recommended that she try these for the pain.  She claims it started after sleeping on a seedy motel mattress few weeks ago.  As noted she has been going to the chiropractor.    Patient also claims she continues to have regular outbreaks of shingles.  She states that she has \"done everything\" in trying to find a solution for the outbreaks.  She states she did has not gotten the Shingrix vaccine because she was told to have a 6-month window without an outbreak before she gets the vaccine and that has never happened.  She states she has seen infectious disease and they do not have an answer for her either.  At this point she is okay with taking Valtrex 500 mg daily.    Patient's PHQ-9 score today was 2.    Review of Systems   All other systems reviewed and are negative.      Objective   Vitals:  /70   Pulse 77   Temp 36.9 °C (98.4 °F)   Resp 20   Ht 1.702 m (5' 7\")   Wt 73 kg (161 lb)   LMP  (LMP Unknown)   SpO2 98%   BMI 25.22 kg/m²     Physical Exam  Vitals reviewed.   Constitutional:       Appearance: She is well-developed.   HENT:      Head: Normocephalic and atraumatic.      Right Ear: Tympanic membrane, " ear canal and external ear normal.      Left Ear: Tympanic membrane, ear canal and external ear normal.      Nose: Nose normal.      Mouth/Throat:      Mouth: Mucous membranes are moist.      Pharynx: Oropharynx is clear.     Eyes:      Extraocular Movements: Extraocular movements intact.      Conjunctiva/sclera: Conjunctivae normal.      Pupils: Pupils are equal, round, and reactive to light.       Cardiovascular:      Rate and Rhythm: Normal rate and regular rhythm.      Heart sounds: Normal heart sounds. No murmur heard.  Pulmonary:      Effort: Pulmonary effort is normal.      Breath sounds: Normal breath sounds. No wheezing.   Abdominal:      General: Abdomen is flat. Bowel sounds are normal.      Palpations: Abdomen is soft.     Musculoskeletal:         General: Normal range of motion.     Skin:     General: Skin is warm and dry.     Neurological:      General: No focal deficit present.      Mental Status: She is alert and oriented to person, place, and time. Mental status is at baseline.     Psychiatric:         Mood and Affect: Mood normal.         Behavior: Behavior normal.         Thought Content: Thought content normal.         Judgment: Judgment normal.       CBC -  Recent Labs     06/13/24 0940 09/26/23  0842 08/10/23  0715   WBC 4.0* 4.0* 4.5   HGB 13.1 13.2 13.7   HCT 39.3 38.9 41.7   * 169 128*   * 98 103*       CMP -  Recent Labs     06/13/24 0940 09/26/23  0842 08/10/23  0715    138 138   K 4.3 3.9 3.8    105 104   CO2 25 26 26   ANIONGAP 13 11 12   BUN 16 11 14   CREATININE 0.58 0.62 0.71   EGFR >90  --   --    MG  --  1.79  --      Recent Labs     06/13/24 0940 09/26/23  0842 08/10/23  0715   ALBUMIN 4.1 3.7 3.8   ALKPHOS 698* 503* 454*   * 114* 121*   * 78* 89*   BILITOT 1.2 1.1 1.0       LIPID PANEL -   Recent Labs     03/17/23  0808 05/24/22  1007 12/06/21  1403   CHOL 276* 245* 192   LDLF 148* 118* 122*   .4 110.0 49.0   TRIG 88 84 106        Recent Labs     09/26/23  0842 03/17/23  0808   BNP 46  --    HGBA1C  --  4.6       Lab Results   Component Value Date    COGXCEDM25 449 08/10/2023       Lab Results   Component Value Date    VITD25 50 08/10/2023        Assessment/Plan   Problem List Items Addressed This Visit       Chronic constipation    Vitamin D deficiency    Relevant Orders    Vitamin D 25-Hydroxy,Total (for eval of Vitamin D levels)    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 Chronic recurrence. Controlled with Valtrex 500 mg daily          Other Visit Diagnoses         Routine general medical examination at a health care facility    -  Primary      Vitamin B12 deficiency        Relevant Orders    CBC    Vitamin B12      Screening for metabolic disorder        Relevant Orders    Comprehensive Metabolic Panel      Hyperglycemia        Relevant Orders    Hemoglobin A1C      Hyperlipidemia, mixed        Relevant Orders    Lipid Panel      Fatigue, unspecified type        Relevant Orders    TSH with reflex to Free T4 if abnormal        Will contact patient with lab results when available.  Continue current meds as directed.  Follow up in 6 months for recheck if all remains stable, sooner if problems arise.     Patient exhibiting no signs of depression and does not need treatment at this time.  Medication list reconciled.  Thank you for visiting today!        Professional services: Some of this note was completed using Dragon voice technology and sometimes the software misinterprets words. This may include unintended errors with respect to translation of words, typographical errors or grammar errors which may not have been identified prior to finalization of the chart note. Please take this into account when reading the note. Thank you.              Hoang Chapman DO 07/21/25 5:01 PM

## 2025-07-21 NOTE — PATIENT INSTRUCTIONS
Use Tylenol 1000 mg 3 times a day or Advil 800 mg 3 times a day with food or Aleve 2 pills twice a day with food as needed for aches and pains.  Follow-up in the office in 2-4 weeks for recheck if symptoms not improved, sooner if condition worsens.